# Patient Record
Sex: FEMALE | Race: WHITE | NOT HISPANIC OR LATINO | Employment: FULL TIME | URBAN - METROPOLITAN AREA
[De-identification: names, ages, dates, MRNs, and addresses within clinical notes are randomized per-mention and may not be internally consistent; named-entity substitution may affect disease eponyms.]

---

## 2017-05-02 ENCOUNTER — HOSPITAL ENCOUNTER (EMERGENCY)
Facility: HOSPITAL | Age: 27
Discharge: HOME | End: 2017-05-02
Attending: EMERGENCY MEDICINE
Payer: COMMERCIAL

## 2017-05-02 VITALS
SYSTOLIC BLOOD PRESSURE: 110 MMHG | HEIGHT: 66 IN | TEMPERATURE: 98 F | WEIGHT: 153 LBS | BODY MASS INDEX: 24.59 KG/M2 | HEART RATE: 66 BPM | DIASTOLIC BLOOD PRESSURE: 56 MMHG | OXYGEN SATURATION: 100 % | RESPIRATION RATE: 18 BRPM

## 2017-05-02 DIAGNOSIS — O20.0 THREATENED ABORTION: Primary | ICD-10-CM

## 2017-05-02 LAB
ABO + RH BLD: NORMAL
ALBUMIN SERPL BCP-MCNC: 4.3 G/DL
ALP SERPL-CCNC: 62 U/L
ALT SERPL W/O P-5'-P-CCNC: 12 U/L
ANION GAP SERPL CALC-SCNC: 9 MMOL/L
AST SERPL-CCNC: 17 U/L
B-HCG UR QL: POSITIVE
BASOPHILS # BLD AUTO: 0.01 K/UL
BASOPHILS NFR BLD: 0.1 %
BILIRUB SERPL-MCNC: 0.4 MG/DL
BILIRUB UR QL STRIP: NEGATIVE
BLD GP AB SCN CELLS X3 SERPL QL: NORMAL
BUN SERPL-MCNC: 9 MG/DL
CALCIUM SERPL-MCNC: 10.2 MG/DL
CHLORIDE SERPL-SCNC: 104 MMOL/L
CLARITY UR REFRACT.AUTO: CLEAR
CO2 SERPL-SCNC: 24 MMOL/L
COLOR UR AUTO: YELLOW
CREAT SERPL-MCNC: 0.9 MG/DL
CTP QC/QA: YES
DIFFERENTIAL METHOD: ABNORMAL
EOSINOPHIL # BLD AUTO: 0.1 K/UL
EOSINOPHIL NFR BLD: 0.5 %
ERYTHROCYTE [DISTWIDTH] IN BLOOD BY AUTOMATED COUNT: 12.6 %
EST. GFR  (AFRICAN AMERICAN): >60 ML/MIN/1.73 M^2
EST. GFR  (NON AFRICAN AMERICAN): >60 ML/MIN/1.73 M^2
GLUCOSE SERPL-MCNC: 93 MG/DL
GLUCOSE UR QL STRIP: NEGATIVE
HCG INTACT+B SERPL-ACNC: NORMAL MIU/ML
HCT VFR BLD AUTO: 40.4 %
HGB BLD-MCNC: 14.3 G/DL
HGB UR QL STRIP: NEGATIVE
KETONES UR QL STRIP: NEGATIVE
LEUKOCYTE ESTERASE UR QL STRIP: NEGATIVE
LYMPHOCYTES # BLD AUTO: 1.8 K/UL
LYMPHOCYTES NFR BLD: 19.1 %
MCH RBC QN AUTO: 32.4 PG
MCHC RBC AUTO-ENTMCNC: 35.4 %
MCV RBC AUTO: 92 FL
MONOCYTES # BLD AUTO: 0.3 K/UL
MONOCYTES NFR BLD: 3 %
NEUTROPHILS # BLD AUTO: 7.4 K/UL
NEUTROPHILS NFR BLD: 77 %
NITRITE UR QL STRIP: NEGATIVE
PH UR STRIP: 7 [PH] (ref 5–8)
PLATELET # BLD AUTO: 254 K/UL
PMV BLD AUTO: 9.5 FL
POTASSIUM SERPL-SCNC: 4.2 MMOL/L
PROT SERPL-MCNC: 7.4 G/DL
PROT UR QL STRIP: NEGATIVE
RBC # BLD AUTO: 4.41 M/UL
SODIUM SERPL-SCNC: 137 MMOL/L
SP GR UR STRIP: 1.01 (ref 1–1.03)
URN SPEC COLLECT METH UR: NORMAL
UROBILINOGEN UR STRIP-ACNC: NEGATIVE EU/DL
WBC # BLD AUTO: 9.62 K/UL

## 2017-05-02 PROCEDURE — 81025 URINE PREGNANCY TEST: CPT | Performed by: EMERGENCY MEDICINE

## 2017-05-02 PROCEDURE — 85025 COMPLETE CBC W/AUTO DIFF WBC: CPT

## 2017-05-02 PROCEDURE — 99284 EMERGENCY DEPT VISIT MOD MDM: CPT | Mod: ,,, | Performed by: EMERGENCY MEDICINE

## 2017-05-02 PROCEDURE — 84702 CHORIONIC GONADOTROPIN TEST: CPT

## 2017-05-02 PROCEDURE — 86900 BLOOD TYPING SEROLOGIC ABO: CPT

## 2017-05-02 PROCEDURE — 80053 COMPREHEN METABOLIC PANEL: CPT

## 2017-05-02 PROCEDURE — 99284 EMERGENCY DEPT VISIT MOD MDM: CPT | Mod: 25

## 2017-05-02 PROCEDURE — 81003 URINALYSIS AUTO W/O SCOPE: CPT

## 2017-05-02 PROCEDURE — 86850 RBC ANTIBODY SCREEN: CPT

## 2017-05-02 NOTE — ED AVS SNAPSHOT
[facility]  [address] [facility] [address] [facility] [address]               Anabel Serrato   2017 10:27 AM   ED    Description:  Female : 1990   Department:  [facility]           Your Care was Coordinated By:     Provider Role From To    Emeka Moore MD Attending Provider 17 1045 17 1101    Silas Mei MD Attending Provider 17 1101 --    Cecy Jacobs PA-C Physician Assistant 17 1252 --      Reason for Visit     Vaginal Bleeding           Diagnoses this Visit        Comments    Threatened     -  Primary       ED Disposition     None           To Do List           Follow-up Information     Follow up with Jamel [facility]. Schedule an appointment as soon as possible for a visit in 1 week.    Specialty:  Internal Medicine    Contact information:    [address] [facility] [address] [phone]    Additional information:    [facility].        Follow up with Amber Baker MD. Schedule an appointment as soon as possible for a visit in 2 days.    Specialties:  Obstetrics, Obstetrics and Gynecology    Contact information:    [address] [facility] [address]  [facility] [id number] [phone]        [facility]     [facility] Nurse Care Line - 24/7 Assistance  Unless otherwise directed by your provider, please contact [facility], our nurse care line that is available for 24/7 assistance.     Registered nurses in the [facility] provide: appointment scheduling, clinical advisement, health education, and other advisory services.  Call: [phone] (toll free)               Medications           Message regarding Medications     Verify the changes and/or additions to your medication regime listed below are the same as discussed with your clinician today.  If any of these changes or additions are incorrect, please notify your  "healthcare provider.             Verify that the below list of medications is an accurate representation of the medications you are currently taking.  If none reported, the list may be blank. If incorrect, please contact your healthcare provider. Carry this list with you in case of emergency.           Current Medications     PNV no15-iron fum & ps cmp-FA (CONCEPT OB) 85-1 mg Cap Take 1 capsule by mouth Daily.           Clinical Reference Information           Your Vitals Were     BP Pulse Temp Resp Height Weight    110/56 (BP Location: Left arm, Patient Position: Sitting, BP Method: Automatic) 66 98.4 °F (36.9 °C) (Oral) 18 5' 6" (1.676 m) 69.4 kg (153 lb)    SpO2 BMI             100% 24.69 kg/m2         Allergies as of 5/2/2017        Reactions    No Known Drug Allergies       Immunizations Administered on Date of Encounter - 5/2/2017     None      ED Micro, Lab, POCT     Start Ordered       Status Ordering Provider    05/02/17 1052 05/02/17 1051  Urinalysis  STAT      Final result     05/02/17 1051 05/02/17 1051  CBC auto differential  STAT      Final result     05/02/17 1051 05/02/17 1051  Comprehensive metabolic panel  STAT      Final result     05/02/17 1051 05/02/17 1051  hCG, quantitative, pregnancy  STAT      Final result     05/02/17 1038 05/02/17 1037  POCT urine pregnancy  Once      Final result       ED Imaging Orders     Start Ordered       Status Ordering Provider    05/02/17 1109 05/02/17 1108  US OB Less Than 14 Wks with Transvag(xpd  1 time imaging      Final result         Discharge Instructions         Understanding Miscarriage: Possible Causes  Miscarriage is common, but finding its cause may not be easy. If a cause can be found, its likely to be a problem with the baby or the structure of the uterus. Other factors cause miscarriage, but they are less common.  Problems with the baby  Either of the following problems with the baby can cause a miscarriage:  · There is a problem with the babys " chromosomes (genes that carry the information needed for life).  · Birth defects  Problems with the uterus or cervix  Any of the following problems with the uterus or cervix can cause a miscarriage:  · The uterus may be divided (have a septum), or have fibroids or adhesions.  · The lining of the uterus may be too thin for the fertilized egg to implant.  · The cervix may be too weak to support the weight of a pregnancy.  Other factors  Any of the following problems can cause a miscarriage:  · A serious illness, such as uncontrolled dabetes mellitus.   · A bad injury, perhaps during a car accident.  · Exposure to toxins or radiation.  What does not cause miscarriage  Plenty of myths and old wives tales try to explain the cause of miscarriage. But they are fiction--not fact. None of the following activities causes miscarriage:  · Carrying groceries  · Lifting a small child  · Wearing high heels  · Coloring your hair  · Having sex  · Vacuuming · Working outside the home  · Being a vegetarian  · Eating spicy foods  · Having a Pap smear  · Riding a horse or a bicycle  · Wishing away or denying a pregnancy       Date Last Reviewed: 6/1/2016  © 3792-6588 [employer]. [address] [place] [address]. All rights reserved. This information is not intended as a substitute for professional medical care. Always follow your healthcare professional's instructions.          [facility] Sign-Up     Activating your [facility] account is as easy as 1-2-3!     1) Visit [email or web address], select Sign Up Now, enter this activation code and your date of birth, then select Next.  [email or web address]  Expires: 6/16/2017  2:56 PM      2) Create a username and password to use when you visit [facility] in the future and select a security question in case you lose your password and select Next.    3) Enter your e-mail address and click Sign Up!    Additional Information  If you have questions, please e-mail  [email or web address] or call [phone] to talk to our [facility] staff. Remember, [facility] is NOT to be used for urgent needs. For medical emergencies, dial 911.          [facility] complies with applicable Federal civil rights laws and does not discriminate on the basis of race, color, national origin, age, disability, or sex.        Language Assistance Services     ATTENTION: Language assistance services are available, free of charge. Please call [phone].      ATENCIÓN: Si habla español, tiene a diana disposición servicios gratuitos de asistencia lingüística. Llame al [phone].     CHÚ Ý: N?u b?n nói Ti?ng Vi?t, có các d?ch v? h? tr? ngôn ng? mi?n phí dành cho b?n. G?i s? [phone].

## 2017-05-02 NOTE — ED TRIAGE NOTES
Pt states she is pregnant and she was spotting this morning. Pt is unsure when she became pregnant.  Her LMP was approximately 1.5 months ago, but she has an irregular cycle. Pt states the spotting was dark red. Pt also reports cramping.

## 2017-05-02 NOTE — ED PROVIDER NOTES
Encounter Date: 2017       History     Chief Complaint   Patient presents with    Vaginal Bleeding     and cramping, lmp 6 weeks ago, + upt at home     Review of patient's allergies indicates:   Allergen Reactions    No known drug allergies      HPI Comments: 26 y.o.  with no significant past medical history presents to the emergency room with a positive pregnancy test from home.  Patient states that LMP was 1-2 months ago.  Patient states she started having spotting and lower abdominal pain today. At time of exam, bleeding has stopped, but cramping has continued.  Patient denies any nausea, vomiting, fevers, chills, blood clots, chest pain, shortness of breath, or any other complaints.          The history is provided by the patient.     History reviewed. No pertinent past medical history.  Past Surgical History:   Procedure Laterality Date    episitomy  2009    during delivery of her son     Family History   Problem Relation Age of Onset    Breast cancer Maternal Grandmother      Social History   Substance Use Topics    Smoking status: Current Every Day Smoker     Years: 7.00     Types: Cigarettes    Smokeless tobacco: Never Used      Comment: Pt smokes 2 cigarettes today    Alcohol use No     Review of Systems   Constitutional: Negative for activity change, chills, fatigue and fever.   HENT: Negative for congestion, facial swelling, sinus pressure and trouble swallowing.    Eyes: Negative for photophobia, pain and discharge.   Respiratory: Negative for apnea, choking and shortness of breath.    Cardiovascular: Negative for chest pain and palpitations.   Gastrointestinal: Negative for abdominal distention, abdominal pain, constipation, diarrhea, nausea and vomiting.   Endocrine: Negative for cold intolerance, polydipsia and polyuria.   Genitourinary: Negative for dysuria, flank pain, hematuria, urgency and vaginal discharge.   Musculoskeletal: Negative for arthralgias, gait problem and  myalgias.   Skin: Negative for color change and rash.   Allergic/Immunologic: Negative for environmental allergies.   Neurological: Negative for dizziness, tremors, speech difficulty, numbness and headaches.   Hematological: Negative for adenopathy.   Psychiatric/Behavioral: Negative for agitation, confusion and dysphoric mood.       Physical Exam   Initial Vitals   BP Pulse Resp Temp SpO2   05/02/17 1023 05/02/17 1023 05/02/17 1023 05/02/17 1023 05/02/17 1023   132/77 81 18 98.3 °F (36.8 °C) 98 %     Physical Exam    Nursing note and vitals reviewed.  Constitutional: She appears well-developed and well-nourished.   HENT:   Head: Normocephalic and atraumatic.   Eyes: EOM are normal. Pupils are equal, round, and reactive to light.   Neck: Normal range of motion. Neck supple. No thyromegaly present. No tracheal deviation present.   Cardiovascular: Normal rate and regular rhythm. Exam reveals no gallop and no friction rub.    No murmur heard.  Pulmonary/Chest: Breath sounds normal. No stridor. No respiratory distress. She has no wheezes. She has no rhonchi. She has no rales. She exhibits no tenderness.   Abdominal: Soft. Bowel sounds are normal. She exhibits no distension and no mass. There is no tenderness. There is no rebound and no guarding.   Musculoskeletal: Normal range of motion. She exhibits no edema or tenderness.   Neurological: She is alert and oriented to person, place, and time.   Skin: Skin is warm and dry.   Psychiatric: She has a normal mood and affect.       Imaging Results         US OB Less Than 14 Wks with Transvag(xpd (Final result) Result time:  05/02/17 13:53:03    Final result by Herberth Ovalle MD (05/02/17 13:53:03)    Impression:      1. Single, viable intrauterine pregnancy.    2. Heart rate at the lower limits of normal might be due to early age. Continued followup is recommended.    3. Small, subchorionic hemorrhage.  ______________________________________     Electronically signed by  resident: COLTON FERREIRA MD  Date:     05/02/17  Time:    13:44            As the supervising and teaching physician, I personally reviewed the images and resident's interpretation and I agree with the findings.          Electronically signed by: QUENTIN PABLO MD  Date:     05/02/17  Time:    13:53     Narrative:    ULTRASOUND PELVIS OBSTETRIC LESS THAN 14 WEEKS WITH TRANSVAGINAL    INDICATION: Bleeding.     COMPARISON: None.    TECHNIQUE: Ultrasound images of the female pelvis were acquired utilizing transabdominal technique with bladder sonographic window and transvaginal technique post-void.     FINDINGS:     UTERUS: Uterine parenchyma is homogeneous without evidence for masses. A gestational sac containing a yolk sac and single embryo is identified. The crown-rump length is 4.38 cm corresponding to a sonographic gestational age of 6 weeks and 0 days. The maximum obtained fetal heart rate is 114 bpm. This rate is at the lower limits of normal but this may be due to early age and continued followup is recommended. A small heterogeneous fluid collection in the left endometrial cavity likely represents a small, nonsignificant subchorionic hemorrhage.    RIGHT OVARY: The right ovary is normal in size and measures 2.4 x 2.3 x 2.6 cm.   Arterial and venous flow are preserved.     LEFT OVARY: The left ovary is normal in size and measures 4.6 x 2.1 x 3.0 cm. A corpus luteum is identified measuring 1.5 x 1.7 x 2.3 cm. Arterial and venous flow are preserved.                ED Course   Procedures  Labs Reviewed   CBC W/ AUTO DIFFERENTIAL - Abnormal; Notable for the following:        Result Value    MCH 32.4 (*)     Gran% 77.0 (*)     Mono% 3.0 (*)     All other components within normal limits   POCT URINE PREGNANCY - Abnormal; Notable for the following:     POC Preg Test, Ur Positive (*)     All other components within normal limits   COMPREHENSIVE METABOLIC PANEL   HCG, QUANTITATIVE, PREGNANCY   URINALYSIS   TYPE & SCREEN  - OB PROFILE             Medical Decision Making:   History:   Old Medical Records: I decided to obtain old medical records.       APC / Resident Notes:   26 y.o.  with no significant past medical history presents to the emergency room with a positive pregnancy test from home and some vaginal spotting.  Physical exam reveals female in no acute distress.  Heart regular rate and rhythm.  Lungs clear to auscultation bilaterally.  Abdomen soft nontender nondistended.  Differential diagnosis includes but is not excluded to threatened  and ectopic pregnancy.  Will obtain CBC, CMP, type and screen, hCG, UA, pregnancy test, and ultrasound.    CBC shows hemoglobin of 14.3 hematocrit 40.4.  CMP unremarkable.  Type and screen shows Rh+.  Beta HCG at 22222.  Urine analysis unremarkable.  Urinary pregnancy positive.  Ultrasound showed a single viable intrauterine pregnancy and a small subchorionic hemorrhage.  Results of ultrasound were discussed with patient.  Patient was to undergo a pelvic exam prior to discharge, however eloped prior to receiving this and discharge paperwork.  Patient was instructed to follow up with OB/GYN in near future and also informed of threatened  with bleed.                ED Course     Clinical Impression:   The encounter diagnosis was Threatened .    Disposition:   Disposition: Eloped  Condition: Stable       Cecy Jacobs PA-C  [phone]

## 2017-05-02 NOTE — PROVIDER PROGRESS NOTES - EMERGENCY DEPT.
Encounter Date: 2017    ED Physician Progress Notes             INTAKE PHYSICIAN EVALUATION  26 y.o.  presents with UPT + at home (LMP 1-2 months ago) started having spotting and lower abdominal pain today. Bleeding has stopped but cramping has continued.       BRIEF PHYSICAL EXAM FINDINGS:  Unremarkable, non toxic    INITIAL INTAKE PLAN:  Early pregnancy, abdominal pain. Eval for ectopic.     I initially evaluated this patient and ordered workup while in intake.  The patient will receive a full evaluation in an ED pod when space is available.  All results from tests ordered in intake will not be followed by the intake team, including myself. All results will be followed by the ED Pod team.    KOBI Moore MD  Emergency Department Staff Physician    10:48 AM 2017

## 2017-05-02 NOTE — DISCHARGE INSTRUCTIONS
Understanding Miscarriage: Possible Causes  Miscarriage is common, but finding its cause may not be easy. If a cause can be found, its likely to be a problem with the baby or the structure of the uterus. Other factors cause miscarriage, but they are less common.  Problems with the baby  Either of the following problems with the baby can cause a miscarriage:  · There is a problem with the babys chromosomes (genes that carry the information needed for life).  · Birth defects  Problems with the uterus or cervix  Any of the following problems with the uterus or cervix can cause a miscarriage:  · The uterus may be divided (have a septum), or have fibroids or adhesions.  · The lining of the uterus may be too thin for the fertilized egg to implant.  · The cervix may be too weak to support the weight of a pregnancy.  Other factors  Any of the following problems can cause a miscarriage:  · A serious illness, such as uncontrolled dabetes mellitus.   · A bad injury, perhaps during a car accident.  · Exposure to toxins or radiation.  What does not cause miscarriage  Plenty of myths and old wives tales try to explain the cause of miscarriage. But they are fiction--not fact. None of the following activities causes miscarriage:  · Carrying groceries  · Lifting a small child  · Wearing high heels  · Coloring your hair  · Having sex  · Vacuuming · Working outside the home  · Being a vegetarian  · Eating spicy foods  · Having a Pap smear  · Riding a horse or a bicycle  · Wishing away or denying a pregnancy       Date Last Reviewed: 6/1/2016  © 5324-5086 [employer]. [address] [place] [address], [place] [address]. All rights reserved. This information is not intended as a substitute for professional medical care. Always follow your healthcare professional's instructions.

## 2017-05-08 ENCOUNTER — TELEPHONE (OUTPATIENT)
Dept: OBSTETRICS AND GYNECOLOGY | Facility: CLINIC | Age: 27
End: 2017-05-08

## 2017-05-08 NOTE — TELEPHONE ENCOUNTER
"----- Message from Hannah Choudhary sent at 5/8/2017 11:32 AM CDT -----  Contact: GOMEZ CHATTERJEE [[id number]]  x_  1st Request  _  2nd Request  _  3rd Request        Who: GOMEZ CHATTERJEE [[id number]]    Why: Patient was seen in ER on 5/2. She states she is "about 6 weeks pregnant" and would like to schedule her initial ob appt. She is unsure when her LMP was but states it was "towards the end of March". Thanks     What Number to Call Back:     When to Expect a call back: (Before the end of the day)   -- if call after 3:00 call back will be tomorrow.    "

## 2017-05-09 ENCOUNTER — TELEPHONE (OUTPATIENT)
Dept: OBSTETRICS AND GYNECOLOGY | Facility: CLINIC | Age: 27
End: 2017-05-09

## 2017-05-09 NOTE — TELEPHONE ENCOUNTER
----- Message from Josiah Peñaloza sent at 5/9/2017  9:09 AM CDT -----  Contact: Pt  X_ 1st Request  _ 2nd Request  _ 3rd Request    Who:GOMEZ CHATTERJEE [[id number]]    Why: Patient is returning a call    What Number to Call Back: [phone]    When to Expect a call back: (Before the end of the day)  -- if call after 3:00 call back will be tomorrow.

## 2017-05-24 ENCOUNTER — INITIAL PRENATAL (OUTPATIENT)
Dept: OBSTETRICS AND GYNECOLOGY | Facility: CLINIC | Age: 27
End: 2017-05-24
Attending: OBSTETRICS & GYNECOLOGY
Payer: COMMERCIAL

## 2017-05-24 ENCOUNTER — LAB VISIT (OUTPATIENT)
Dept: LAB | Facility: OTHER | Age: 27
End: 2017-05-24
Attending: OBSTETRICS & GYNECOLOGY
Payer: COMMERCIAL

## 2017-05-24 VITALS
DIASTOLIC BLOOD PRESSURE: 70 MMHG | BODY MASS INDEX: 24.66 KG/M2 | WEIGHT: 152.75 LBS | SYSTOLIC BLOOD PRESSURE: 120 MMHG

## 2017-05-24 DIAGNOSIS — Z34.81 ENCOUNTER FOR SUPERVISION OF OTHER NORMAL PREGNANCY IN FIRST TRIMESTER: ICD-10-CM

## 2017-05-24 PROBLEM — Z34.90 SUPERVISION OF NORMAL PREGNANCY: Status: ACTIVE | Noted: 2017-05-24

## 2017-05-24 LAB
C TRACH DNA SPEC QL NAA+PROBE: NOT DETECTED
HBV SURFACE AG SERPL QL IA: NEGATIVE
N GONORRHOEA DNA SPEC QL NAA+PROBE: NOT DETECTED
RPR SER QL: NORMAL

## 2017-05-24 PROCEDURE — 87340 HEPATITIS B SURFACE AG IA: CPT

## 2017-05-24 PROCEDURE — 86592 SYPHILIS TEST NON-TREP QUAL: CPT

## 2017-05-24 PROCEDURE — 87591 N.GONORRHOEAE DNA AMP PROB: CPT

## 2017-05-24 PROCEDURE — 36415 COLL VENOUS BLD VENIPUNCTURE: CPT

## 2017-05-24 PROCEDURE — 0502F SUBSEQUENT PRENATAL CARE: CPT | Mod: S$GLB,,, | Performed by: OBSTETRICS & GYNECOLOGY

## 2017-05-24 PROCEDURE — 87086 URINE CULTURE/COLONY COUNT: CPT

## 2017-05-24 PROCEDURE — 99999 PR PBB SHADOW E&M-EST. PATIENT-LVL III: CPT | Mod: PBBFAC,,, | Performed by: OBSTETRICS & GYNECOLOGY

## 2017-05-24 PROCEDURE — 86703 HIV-1/HIV-2 1 RESULT ANTBDY: CPT

## 2017-05-24 PROCEDURE — 86762 RUBELLA ANTIBODY: CPT

## 2017-05-24 NOTE — PROGRESS NOTES
Denies complaints.  Diagnoses and all orders for this visit:    Encounter for supervision of other normal pregnancy in first trimester  -     Rubella antibody, IgG; Future  -     RPR; Future  -     HIV-1 and HIV-2 antibodies; Future  -     Urine culture  -     Hepatitis B surface antigen; Future  -     US OB/GYN Procedure ([facility]); Future  -     C. trachomatis/N. gonorrhoeae by AMP DNA Cervix

## 2017-05-25 LAB
BACTERIA UR CULT: NORMAL
HIV 1+2 AB+HIV1 P24 AG SERPL QL IA: NEGATIVE
RUBV IGG SER-ACNC: 24.6 IU/ML
RUBV IGG SER-IMP: REACTIVE

## 2017-06-02 ENCOUNTER — PROCEDURE VISIT (OUTPATIENT)
Dept: OBSTETRICS AND GYNECOLOGY | Facility: CLINIC | Age: 27
End: 2017-06-02
Attending: OBSTETRICS & GYNECOLOGY
Payer: COMMERCIAL

## 2017-06-02 DIAGNOSIS — Z34.81 ENCOUNTER FOR SUPERVISION OF OTHER NORMAL PREGNANCY IN FIRST TRIMESTER: ICD-10-CM

## 2017-06-02 PROCEDURE — 76801 OB US < 14 WKS SINGLE FETUS: CPT | Mod: S$GLB,,, | Performed by: OBSTETRICS & GYNECOLOGY

## 2017-06-12 ENCOUNTER — TELEPHONE (OUTPATIENT)
Dept: OBSTETRICS AND GYNECOLOGY | Facility: CLINIC | Age: 27
End: 2017-06-12

## 2017-06-22 ENCOUNTER — TELEPHONE (OUTPATIENT)
Dept: OBSTETRICS AND GYNECOLOGY | Facility: CLINIC | Age: 27
End: 2017-06-22

## 2017-06-22 ENCOUNTER — ROUTINE PRENATAL (OUTPATIENT)
Dept: OBSTETRICS AND GYNECOLOGY | Facility: CLINIC | Age: 27
End: 2017-06-22
Payer: COMMERCIAL

## 2017-06-22 VITALS
DIASTOLIC BLOOD PRESSURE: 68 MMHG | SYSTOLIC BLOOD PRESSURE: 110 MMHG | BODY MASS INDEX: 25.87 KG/M2 | WEIGHT: 160.25 LBS

## 2017-06-22 DIAGNOSIS — Z34.82 ENCOUNTER FOR SUPERVISION OF OTHER NORMAL PREGNANCY IN SECOND TRIMESTER: Primary | ICD-10-CM

## 2017-06-22 PROCEDURE — 99999 PR PBB SHADOW E&M-EST. PATIENT-LVL III: CPT | Mod: PBBFAC,,, | Performed by: OBSTETRICS & GYNECOLOGY

## 2017-06-22 PROCEDURE — 0502F SUBSEQUENT PRENATAL CARE: CPT | Mod: S$GLB,,, | Performed by: OBSTETRICS & GYNECOLOGY

## 2017-06-22 NOTE — PROGRESS NOTES
Patient doing well today. No vaginal bleeding. Nausea/vomiting improved. Not interested in genetic testing.     /68   Wt 72.7 kg (160 lb 4.4 oz)   BMI 25.87 kg/m²     27 y.o., at 13w2d by Estimated Date of Delivery: 17  Patient Active Problem List   Diagnosis    Supervision of normal pregnancy     OB History    Para Term  AB Living   3 2 2     2   SAB TAB Ectopic Multiple Live Births           2      # Outcome Date GA Lbr Ric/2nd Weight Sex Delivery Anes PTL Lv   3 Current            2 Term 12 41w0d    Vag-Spont   LATOSHA   1 Term 09   3.544 kg (7 lb 13 oz) M Vag-Spont  N LATOSHA          Dating reviewed    Allergies and problem list reviewed and updated    Medical and surgical history reviewed    Prenatal labs reviewed and updated    Physical Exam:  ABD: soft, gravid, nontende    Assessment:  prenatal    Plan:   Declined genetic screening  [facility] U/S order placed  Follow up 4 Weeks

## 2017-06-28 ENCOUNTER — TELEPHONE (OUTPATIENT)
Dept: OBSTETRICS AND GYNECOLOGY | Facility: CLINIC | Age: 27
End: 2017-06-28

## 2017-06-28 NOTE — TELEPHONE ENCOUNTER
Called pt and instructed her to call MFM at [phone] to schedule u/s. Also informed her that appt should be  booked between JUL 28-AUG 4. MACARENA: 12/22/2017. Pt voiced understanding.

## 2017-06-28 NOTE — TELEPHONE ENCOUNTER
----- Message from Faby Vargas RN sent at 6/28/2017  3:46 PM CDT -----  Contact: Patient  Sounds good, tell pt to call our office [phone] and her u/s should be booked between JUL 28-AUG 4    Thanks!  Faby  ----- Message -----  From: Nayana Rivera LPN  Sent: 6/28/2017   3:41 PM  To: Faby Vargas RN    I spoke with Dr Hoovre and she says to use 12/22/2017 as her MACARENA. Thanks Faby    ----- Message -----  From: Faby Vargas RN  Sent: 6/28/2017   3:34 PM  To: Nayana Rivera LPN    Depends on which MACARENA they are using. The MACARENA placed with the order is 12/26/17 anatomy would be between 8/1 - 8/8 if using MACARENA 12/22/17 then July 28-Aug 4 would be the time frame. Confirm which MACARENA and we can schedule, it should not be the end of August    faby    ----- Message -----  From: Nayana Rivera LPN  Sent: 6/28/2017   3:28 PM  To: CHANELLE Blood. I spoke with this pt and she stated that she was told that she could not get an US before 08/25/2017. Is that true?    ----- Message -----  From: Olena Park  Sent: 6/28/2017   1:49 PM  To: Kiko DAVID Staff    x_ 1st Request  _ 2nd Request  _ 3rd Request    Who: GOMEZ CHATTERJEE [[id number]]    Why: Patient is calling in regards to requesting a ultrasound order be placed before 08/25/17  to reveal gender. Patient is needing a call back    What Number to Call Back: [phone]    When to Expect a call back: (Before the end of the day)  -- if call after 3:00 call back will be tomorrow.

## 2017-07-05 ENCOUNTER — TELEPHONE (OUTPATIENT)
Dept: OBSTETRICS AND GYNECOLOGY | Facility: CLINIC | Age: 27
End: 2017-07-05

## 2017-07-06 ENCOUNTER — TELEPHONE (OUTPATIENT)
Dept: OBSTETRICS AND GYNECOLOGY | Facility: CLINIC | Age: 27
End: 2017-07-06

## 2017-07-14 ENCOUNTER — TELEPHONE (OUTPATIENT)
Dept: OBSTETRICS AND GYNECOLOGY | Facility: CLINIC | Age: 27
End: 2017-07-14

## 2017-07-14 NOTE — TELEPHONE ENCOUNTER
Called pt to discuss centering-would like to participate but employer making it difficult to make appts-pt scheduled for 8/1

## 2017-07-28 ENCOUNTER — APPOINTMENT (OUTPATIENT)
Dept: LAB | Facility: OTHER | Age: 27
End: 2017-07-28
Attending: OBSTETRICS & GYNECOLOGY
Payer: MEDICAID

## 2017-07-28 ENCOUNTER — OFFICE VISIT (OUTPATIENT)
Dept: MATERNAL FETAL MEDICINE | Facility: CLINIC | Age: 27
End: 2017-07-28
Payer: MEDICAID

## 2017-07-28 DIAGNOSIS — Q66.89 CLUB FOOT, UNSPECIFIED LATERALITY: ICD-10-CM

## 2017-07-28 DIAGNOSIS — O35.9XX1 FETAL ABNORMALITY AFFECTING MANAGEMENT OF MOTHER, FETUS 1: Primary | ICD-10-CM

## 2017-07-28 DIAGNOSIS — Z36.89 ENCOUNTER FOR FETAL ANATOMIC SURVEY: ICD-10-CM

## 2017-07-28 DIAGNOSIS — Z34.82 ENCOUNTER FOR SUPERVISION OF OTHER NORMAL PREGNANCY IN SECOND TRIMESTER: ICD-10-CM

## 2017-07-28 PROCEDURE — 99202 OFFICE O/P NEW SF 15 MIN: CPT | Mod: S$PBB,TH,25, | Performed by: OBSTETRICS & GYNECOLOGY

## 2017-07-28 PROCEDURE — 76811 OB US DETAILED SNGL FETUS: CPT | Mod: 26,S$PBB,, | Performed by: OBSTETRICS & GYNECOLOGY

## 2017-07-28 NOTE — LETTER
July 28, 2017      Jerri Hoover MD  [address]  [address]  [facility] [address]           [facility]  [address]  [facility] [address]  Phone: [phone]          Patient: Anabel Serrato   MR Number: [id number]   YOB: 1990   Date of Visit: 7/28/2017       Dear Dr. Jerri Hoover:    Thank you for referring Anabel Serrato to me for evaluation. Attached you will find relevant portions of my assessment and plan of care.    If you have questions, please do not hesitate to call me. I look forward to following Anabel Serrato along with you.    Sincerely,    Ava Patel MD    Enclosure  CC:  No Recipients    If you would like to receive this communication electronically, please contact [email or web address] or [phone] to request more information on [employer] Link access.    For providers and/or their staff who would like to refer a patient to [facility], please contact us through our one-stop-shop provider referral line, [facility], at [phone].    If you feel you have received this communication in error or would no longer like to receive these types of communications, please e-mail [email or web address]

## 2017-07-28 NOTE — PROGRESS NOTES
Indication  ========    Fetal anatomy survey.    Method  ======    Transabdominal ultrasound examination, Voluson E10. View: Good view.    Pregnancy  =========    Rios pregnancy. Number of fetuses: 1.    Dating  ======    Cycle: regular cycle  Ultrasound examination on: 7/28/2017  GA by U/S based upon: AC, BPD, Femur, HC  GA by U/S 18 w + 6 d  MACARENA by U/S: 12/23/2017  Assigned: Dating performed on 06/2/2017, based on ultrasound (CRL)  Assigned GA 19 w + 0 d  Assigned MACARENA: 12/22/2017    General Evaluation  ==============    Cardiac activity: present.  bpm.  Fetal movements: visualized.  Presentation: Unstable lie.  Placenta: posterior.  Umbilical cord: 3 vessel cord, PCI suboptimal.  Amniotic fluid: normal amount.    Fetal Biometry  ============    Fetal Biometry  BPD 41.7 mm 18w 4d Hadlock  OFD 56.1 mm 19w 5d Jocelin  .5 mm 18w 4d Hadlock  .4 mm 19w 0d Hadlock  Femur 29.4 mm 19w 0d Hadlock  Cerebellum tr 19.7 mm 19w 5d Rosen  CM 5.1 mm  Nuchal fold 2.96 mm   g  Calculated by: Hadlock (BPD-HC-AC-FL)  EFW (lb) 0 lb  EFW (oz) 9 oz  Cephalic index 0.74  HC / AC 1.16  FL / BPD 0.71  FL / AC 0.22   bpm  Head / Face / Neck   6.5 mm  Nasal bone 5.7 mm    Fetal Anatomy  ===========    Cranium: normal  Lateral ventricles: normal  Choroid plexus: normal  Midline falx: normal  Cavum septi pellucidi: normal  Cerebellum: normal  Cisterna magna: normal  Head shape: normal  Rt lateral ventricle: normal  Lt lateral ventricle: normal  Rt choroid plexus: normal  Lt choroid plexus: normal  Parenchyma: normal  Third ventricle: normal  Posterior fossa: normal  Cerebellar lobes: normal  Vermis: normal  Neck: normal  Nuchal fold: normal  Lips: normal  Profile: normal  Nose: normal  Maxilla: normal  Mandible: normal  RVOT: normal  LVOT: normal  4-chamber view: 4-chamber suboptimal, septum suboptimal  Situs: normal  Aortic arch: normal  Ductal arch: normal  SVC: normal  IVC: normal  3-vessel  view: suboptimal  3-vessel-trachea view: normal  Cardiac position: normal  Cardiac axis: normal  Cardiac size: normal  Cardiac rhythm: normal  Rt lung: normal  Lt lung: normal  Diaphragm: suboptimal  Cord insertion: normal  Stomach: normal  Kidneys: normal  Bladder: normal  Genitals: normal  Abdom. wall: normal  Abdom. cavity: normal  Rt kidney: normal  Lt kidney: normal  Liver: normal  Bowel: normal  Cervical spine: suboptimal  Thoracic spine: suboptimal  Lumbar spine: suboptimal  Sacral spine: suboptimal  Arms: suboptimal  Legs: normal  Rt arm: suboptimal  Lt arm: suboptimal  Rt hand: normal  Lt hand: normal  Rt leg: normal  Lt leg: normal  Rt foot: normal  Lt foot: abnormal  Lt foot: clubbing  Position of hands: normal  Position of feet: normal  Wants to know gender: no    Maternal Structures  ===============    Uterus / Cervix  Uterus: Normal  Cervical length 37.0 mm  Ovaries / Tubes / Adnexa  Rt ovary: Visualized  Lt ovary: Visualized  Other: Uterus and adnexa normal    Consultation  ==========    Type: Unilateral fetal clubfoot.  There is no family history of birth defects or clubfeet.    We discussed that clubfoot can be seen as an isolated condition or in association with conditions including but not limited to  chromosomal abnormalities (most commonly Trisomy 18), genetic disorders, neurological/musculoskeletal conditions, spina bifida, and  oligohydramnios. We discussed that there is the potential for a false diagnosis more commonly in the third trimester due to transient foot  position. We reviewed the potential association with other anomalies. It is more common in males than females. Those with associated  anomalies have a worse prognosis. If isolated clubfoot, conservative management with manipulation and serial casting is used and at times  surgical therapy is warranted. If this finding is confirmed on follow up ultrasounds, we would recommend considering consultation with  orthopedics. Clubfoot is  often familial. We discussed the risks, benefits, and alternatives of the quad screen. We did discuss the risks,  benefits, alternatives, and limitations of NIPT. We discussed the sensitivity and specificity and reviewed the potential for no call results. The  risks, benefits, and alternatives and  limitations of amniocentesis were also reviewed. We discussed the risk of 1 in 500 of a pregnancy related complication including pregnancy  loss. We also discussed the options of karyotype and microarray and assessment for ONTD. The patient elected to proceed with NIPT and  MSAFP only to assess for ONTD.    A total of 20 minutes was spent in face to face time with greater than half of that time spent in counseling and coordination of care.    Impression  =========    Rios live intrauterine pregnancy.  Biometry agrees with the clinical dating.  Placental cord insertion is suboptimally seen but is near fundus. Recommend reassessing at follow up to see if marginal.  Amniotic fluid volume is normal by qualitative assessment.  There is a left clubbed foot.  Some of the anatomy was suboptimally visualized. The remainder of the anatomy that was seen appeared normal.  Placenta is posterior without previa.  Transabdominal cervical length is normal.      Recommendation  ==============    Recommend follow up ultrasound in 4-5 weeks for fetal growth, assess suboptimally visualized anatomy/PCI, and recheck feet.  Fetal echo due to clubfoot.  Patient elected for NIPT and MSAFP only.

## 2017-08-01 ENCOUNTER — ROUTINE PRENATAL (OUTPATIENT)
Dept: OBSTETRICS AND GYNECOLOGY | Facility: CLINIC | Age: 27
End: 2017-08-01
Payer: MEDICAID

## 2017-08-01 VITALS
WEIGHT: 171.75 LBS | DIASTOLIC BLOOD PRESSURE: 68 MMHG | SYSTOLIC BLOOD PRESSURE: 112 MMHG | BODY MASS INDEX: 27.72 KG/M2

## 2017-08-01 DIAGNOSIS — Z34.82 ENCOUNTER FOR SUPERVISION OF OTHER NORMAL PREGNANCY IN SECOND TRIMESTER: Primary | ICD-10-CM

## 2017-08-01 PROCEDURE — 99213 OFFICE O/P EST LOW 20 MIN: CPT | Mod: TH,S$PBB,, | Performed by: STUDENT IN AN ORGANIZED HEALTH CARE EDUCATION/TRAINING PROGRAM

## 2017-08-01 PROCEDURE — 99999 PR PBB SHADOW E&M-EST. PATIENT-LVL II: CPT | Mod: PBBFAC,,, | Performed by: STUDENT IN AN ORGANIZED HEALTH CARE EDUCATION/TRAINING PROGRAM

## 2017-08-01 PROCEDURE — 99212 OFFICE O/P EST SF 10 MIN: CPT | Mod: PBBFAC,PO | Performed by: STUDENT IN AN ORGANIZED HEALTH CARE EDUCATION/TRAINING PROGRAM

## 2017-08-01 NOTE — PROGRESS NOTES
Complaints today: uri today.  She is having trouble with her job letting her go to doc appts.  Good fm.  Denies ctx, vb, lof     /68   Wt 77.9 kg (171 lb 11.8 oz)   BMI 27.72 kg/m²     27 y.o., at 19w0d by Estimated Date of Delivery: 17  Patient Active Problem List   Diagnosis    Supervision of normal pregnancy     OB History    Para Term  AB Living   3 2 2     2   SAB TAB Ectopic Multiple Live Births           2      # Outcome Date GA Lbr Ric/2nd Weight Sex Delivery Anes PTL Lv   3 Current            2 Term 12 41w0d    Vag-Spont   LATOSHA   1 Term 09   3.544 kg (7 lb 13 oz) M Vag-Spont  N LATOSHA          Dating reviewed    Allergies and problem list reviewed and updated    Medical and surgical history reviewed    Prenatal labs reviewed and updated    Physical Exam:  ABD: soft, gravid, nontender,     Assessment:  Anabel RODRIGUEZ was seen today for routine prenatal visit.    Diagnoses and all orders for this visit:    Encounter for supervision of other normal pregnancy in second trimester         Plan:   follow up labs   follow up 4 Weeks, kick counts, labor precautions

## 2017-08-02 ENCOUNTER — TELEPHONE (OUTPATIENT)
Dept: MATERNAL FETAL MEDICINE | Facility: CLINIC | Age: 27
End: 2017-08-02

## 2017-08-02 LAB — EXT MATERNIT21: NEGATIVE

## 2017-08-02 NOTE — TELEPHONE ENCOUNTER
Pt has been notified of NEGATIVE [id number] result. This specimen showed an expected representation of chromosomes 13, 18 and 21 material consistent with MALE fetus. Pt verbalized understanding of information.

## 2017-08-25 ENCOUNTER — CLINICAL SUPPORT (OUTPATIENT)
Dept: PEDIATRIC CARDIOLOGY | Facility: CLINIC | Age: 27
End: 2017-08-25
Attending: PEDIATRICS
Payer: MEDICAID

## 2017-08-25 ENCOUNTER — OFFICE VISIT (OUTPATIENT)
Dept: MATERNAL FETAL MEDICINE | Facility: CLINIC | Age: 27
End: 2017-08-25
Attending: OBSTETRICS & GYNECOLOGY
Payer: MEDICAID

## 2017-08-25 VITALS
SYSTOLIC BLOOD PRESSURE: 102 MMHG | HEART RATE: 84 BPM | HEIGHT: 66 IN | BODY MASS INDEX: 29.69 KG/M2 | WEIGHT: 184.75 LBS | DIASTOLIC BLOOD PRESSURE: 58 MMHG

## 2017-08-25 VITALS
WEIGHT: 184.75 LBS | BODY MASS INDEX: 29.82 KG/M2 | SYSTOLIC BLOOD PRESSURE: 102 MMHG | DIASTOLIC BLOOD PRESSURE: 58 MMHG

## 2017-08-25 DIAGNOSIS — Z36.89 ENCOUNTER FOR ULTRASOUND TO CHECK FETAL GROWTH: Primary | ICD-10-CM

## 2017-08-25 DIAGNOSIS — O35.9XX1 FETAL ABNORMALITY AFFECTING MANAGEMENT OF MOTHER, FETUS 1: ICD-10-CM

## 2017-08-25 DIAGNOSIS — O35.HXX0 CLUB FOOT OF FETUS AFFECTING MANAGEMENT OF MOTHER IN SINGLETON PREGNANCY, ANTEPARTUM: ICD-10-CM

## 2017-08-25 DIAGNOSIS — O35.HXX0 CLUB FOOT OF FETUS AFFECTING MANAGEMENT OF MOTHER IN SINGLETON PREGNANCY, ANTEPARTUM: Primary | ICD-10-CM

## 2017-08-25 PROCEDURE — 76827 ECHO EXAM OF FETAL HEART: CPT | Mod: PBBFAC | Performed by: PEDIATRICS

## 2017-08-25 PROCEDURE — 76827 ECHO EXAM OF FETAL HEART: CPT | Mod: 26,S$PBB,, | Performed by: PEDIATRICS

## 2017-08-25 PROCEDURE — 3008F BODY MASS INDEX DOCD: CPT | Mod: ,,, | Performed by: PEDIATRICS

## 2017-08-25 PROCEDURE — 99999 PR PBB SHADOW E&M-EST. PATIENT-LVL III: CPT | Mod: PBBFAC,,, | Performed by: PEDIATRICS

## 2017-08-25 PROCEDURE — 76816 OB US FOLLOW-UP PER FETUS: CPT | Mod: 26,S$PBB,, | Performed by: OBSTETRICS & GYNECOLOGY

## 2017-08-25 PROCEDURE — 99203 OFFICE O/P NEW LOW 30 MIN: CPT | Mod: 25,S$PBB,, | Performed by: PEDIATRICS

## 2017-08-25 PROCEDURE — 3008F BODY MASS INDEX DOCD: CPT | Mod: ,,, | Performed by: OBSTETRICS & GYNECOLOGY

## 2017-08-25 PROCEDURE — 76825 ECHO EXAM OF FETAL HEART: CPT | Mod: 26,S$PBB,, | Performed by: PEDIATRICS

## 2017-08-25 PROCEDURE — 93325 DOPPLER ECHO COLOR FLOW MAPG: CPT | Mod: 26,S$PBB,, | Performed by: PEDIATRICS

## 2017-08-25 PROCEDURE — 76825 ECHO EXAM OF FETAL HEART: CPT | Mod: PBBFAC | Performed by: PEDIATRICS

## 2017-08-25 PROCEDURE — 93325 DOPPLER ECHO COLOR FLOW MAPG: CPT | Mod: PBBFAC | Performed by: PEDIATRICS

## 2017-08-25 PROCEDURE — 99213 OFFICE O/P EST LOW 20 MIN: CPT | Mod: S$PBB,25,TH, | Performed by: OBSTETRICS & GYNECOLOGY

## 2017-08-25 PROCEDURE — 99999 PR PBB SHADOW E&M-EST. PATIENT-LVL II: CPT | Mod: PBBFAC,,, | Performed by: OBSTETRICS & GYNECOLOGY

## 2017-08-25 NOTE — PROGRESS NOTES
See viewpoint US report    Discussed results of US- confirmation of L club foot. No family history.

## 2017-08-25 NOTE — LETTER
August 25, 2017      Ava Patel MD  [address]  4th [address]  [facility] [address]           [facility]  [address]  [facility] [address]  Phone: [phone]          Patient: Anabel Serrato   MR Number: [id number]   YOB: 1990   Date of Visit: 8/25/2017       Dear Dr. Ava Patel:    Thank you for referring Anabel Serrato to me for evaluation. Attached you will find relevant portions of my assessment and plan of care.    If you have questions, please do not hesitate to call me. I look forward to following Anabel Serrato along with you.    Sincerely,    Matheus Wright MD    Enclosure  CC:  No Recipients    If you would like to receive this communication electronically, please contact [email or web address] or [phone] to request more information on [employer] Link access.    For providers and/or their staff who would like to refer a patient to [facility], please contact us through our one-stop-shop provider referral line, [facility], at [phone].    If you feel you have received this communication in error or would no longer like to receive these types of communications, please e-mail [email or web address]

## 2017-08-25 NOTE — LETTER
August 28, 2017      Jerri Hoover MD  [address]  [address]  [facility] [address]           [facility]  [address]  [facility] [address]  Phone: [phone]  Fax: [phone]          Patient: Anabel Serrato   MR Number: [id number]   YOB: 1990   Date of Visit: 8/25/2017       Dear Dr. Jerri Hoover:    Thank you for referring Anabel Serrato to me for evaluation. Attached you will find relevant portions of my assessment and plan of care.    If you have questions, please do not hesitate to call me. I look forward to following Anabel Serrato along with you.    Sincerely,    Pedro Tony  CC:  No Recipients    If you would like to receive this communication electronically, please contact [email or web address] or [phone] to request more information on Clink Link access.    For providers and/or their staff who would like to refer a patient to [facility], please contact us through our one-stop-shop provider referral line, [facility], at [phone].    If you feel you have received this communication in error or would no longer like to receive these types of communications, please e-mail [email or web address]

## 2017-08-28 NOTE — PROGRESS NOTES
"Ms. Serrato  is a 27 y.o. year old  , referred by Dr. Patel because of fetal clubbed foot (L).    The patient presented at approximately 22 3/7 weeks gestation.  The patient denied any complaints.    Past medical history: Unremarkable.  Past surgical history: Negative.  Past gestational history: The patient has two healthy sons (8 and 6 y/o).    Family history: Negative for congenital heart disease, and sudden death during childhood.    Medications:   Outpatient Encounter Prescriptions as of 2017   Medication Sig Dispense Refill    PNV no15-iron fum & ps cmp-FA (CONCEPT OB) 85-1 mg Cap Take 1 capsule by mouth Daily.       No facility-administered encounter medications on file as of 2017.        Allergies: Adhesive and No known drug allergies    Blood pressure (!) 102/58, pulse 84, height 5' 6.02" (1.677 m), weight 83.8 kg (184 lb 11.9 oz).    Fetal echocardiogram revealed a four chamber fetal heart with situs solitus.  The ventricles appeared to be equal in size.  The contractility of both ventricles was good.  The fetal heart rate was within the normal range, and regular.  The interventricular septum appeared to be intact.  There were normally related great arteries seen.  The ductal and aortic arch were well visualized, and appeared to be widely patent.  There was no pleural or pericardial effusion seen.    Doppler analysis revealed a three vessel umbilical cord, with normal flow patterns, and velocities by Doppler.  There was a normal flow pattern seen in the ductus venosus.  There was evidence of normal systemic, and pulmonary venous return seen.  There was a normal right to left shunt seen across the foramen ovale.  There were normal inflow patterns seen across the AV-valves, without significant insufficiency.  There was no ventricular level shunt seen.  The right and left ventricular outflow tract, and ductal and aortic arch appeared to be unobstructed.    Impression:  It is our impression " that Ms. Serrato had a normal fetal echocardiogram.  As you know, small defects, and coarctation of the aorta cannot always be ruled out on fetal echocardiogram.  We discussed our findings with the patient, reviewed our images, and answered her questions. We also discussed the limitations of fetal echocardiography, but emphasized that her child appears to have normal cardiac anatomy.  No further follow up is scheduled in our clinic, but, of course, we will always be available to reevaluate this patient, if needed.  Time spent: 30 minutes, 50% dedicated to counseling.

## 2017-08-29 ENCOUNTER — ROUTINE PRENATAL (OUTPATIENT)
Dept: OBSTETRICS AND GYNECOLOGY | Facility: CLINIC | Age: 27
End: 2017-08-29
Payer: MEDICAID

## 2017-08-29 VITALS
BODY MASS INDEX: 30.22 KG/M2 | SYSTOLIC BLOOD PRESSURE: 110 MMHG | WEIGHT: 187.38 LBS | DIASTOLIC BLOOD PRESSURE: 56 MMHG

## 2017-08-29 DIAGNOSIS — Z34.82 ENCOUNTER FOR SUPERVISION OF OTHER NORMAL PREGNANCY IN SECOND TRIMESTER: Primary | ICD-10-CM

## 2017-08-29 PROCEDURE — 3008F BODY MASS INDEX DOCD: CPT | Mod: ,,, | Performed by: STUDENT IN AN ORGANIZED HEALTH CARE EDUCATION/TRAINING PROGRAM

## 2017-08-29 PROCEDURE — 99213 OFFICE O/P EST LOW 20 MIN: CPT | Mod: TH,S$PBB,, | Performed by: STUDENT IN AN ORGANIZED HEALTH CARE EDUCATION/TRAINING PROGRAM

## 2017-08-29 PROCEDURE — 99212 OFFICE O/P EST SF 10 MIN: CPT | Mod: PBBFAC,PO | Performed by: STUDENT IN AN ORGANIZED HEALTH CARE EDUCATION/TRAINING PROGRAM

## 2017-08-29 PROCEDURE — 99999 PR PBB SHADOW E&M-EST. PATIENT-LVL II: CPT | Mod: PBBFAC,,, | Performed by: STUDENT IN AN ORGANIZED HEALTH CARE EDUCATION/TRAINING PROGRAM

## 2017-08-29 NOTE — PROGRESS NOTES
Complaints today: none  Good fm.  Denies ctx, vb, lof.  Fetal echo normal    BP (!) 110/56   Wt 85 kg (187 lb 6.3 oz)   BMI 30.22 kg/m²     27 y.o., at 23w4d by Estimated Date of Delivery: 17  Patient Active Problem List   Diagnosis    Supervision of normal pregnancy    Club foot of fetus affecting management of mother in jerome pregnancy, antepartum     OB History    Para Term  AB Living   3 2 2     2   SAB TAB Ectopic Multiple Live Births           2      # Outcome Date GA Lbr Ric/2nd Weight Sex Delivery Anes PTL Lv   3 Current            2 Term 12 41w0d    Vag-Spont   LATOSHA   1 Term 09   3.544 kg (7 lb 13 oz) M Vag-Spont  N LATOSHA          Dating reviewed    Allergies and problem list reviewed and updated    Medical and surgical history reviewed    Prenatal labs reviewed and updated    Physical Exam:  ABD: soft, gravid, nontender,     Assessment:  Anabel RODRIGUEZ was seen today for routine prenatal visit.    Diagnoses and all orders for this visit:    Encounter for supervision of other normal pregnancy in second trimester  -     CBC auto differential; Future  -     OB Glucose Screen; Future         Plan:   follow up labs   follow up 4 Weeks, kick counts, labor precautions

## 2017-09-22 ENCOUNTER — OFFICE VISIT (OUTPATIENT)
Dept: MATERNAL FETAL MEDICINE | Facility: CLINIC | Age: 27
End: 2017-09-22
Attending: OBSTETRICS & GYNECOLOGY
Payer: MEDICAID

## 2017-09-22 ENCOUNTER — LAB VISIT (OUTPATIENT)
Dept: LAB | Facility: OTHER | Age: 27
End: 2017-09-22
Attending: OBSTETRICS & GYNECOLOGY
Payer: MEDICAID

## 2017-09-22 DIAGNOSIS — Z36.89 ENCOUNTER FOR ULTRASOUND TO CHECK FETAL GROWTH: ICD-10-CM

## 2017-09-22 DIAGNOSIS — O35.9XX1 FETAL ABNORMALITY AFFECTING MANAGEMENT OF MOTHER, FETUS 1: ICD-10-CM

## 2017-09-22 DIAGNOSIS — O35.HXX0 CLUB FOOT OF FETUS AFFECTING MANAGEMENT OF MOTHER IN SINGLETON PREGNANCY, ANTEPARTUM: ICD-10-CM

## 2017-09-22 DIAGNOSIS — Z34.82 ENCOUNTER FOR SUPERVISION OF OTHER NORMAL PREGNANCY IN SECOND TRIMESTER: ICD-10-CM

## 2017-09-22 LAB
BASOPHILS # BLD AUTO: 0.01 K/UL
BASOPHILS NFR BLD: 0.1 %
DIFFERENTIAL METHOD: ABNORMAL
EOSINOPHIL # BLD AUTO: 0.2 K/UL
EOSINOPHIL NFR BLD: 1.4 %
ERYTHROCYTE [DISTWIDTH] IN BLOOD BY AUTOMATED COUNT: 13.2 %
GLUCOSE SERPL-MCNC: 110 MG/DL
HCT VFR BLD AUTO: 32.4 %
HGB BLD-MCNC: 10.8 G/DL
LYMPHOCYTES # BLD AUTO: 2 K/UL
LYMPHOCYTES NFR BLD: 15.2 %
MCH RBC QN AUTO: 32.3 PG
MCHC RBC AUTO-ENTMCNC: 33.3 G/DL
MCV RBC AUTO: 97 FL
MONOCYTES # BLD AUTO: 0.6 K/UL
MONOCYTES NFR BLD: 4.6 %
NEUTROPHILS # BLD AUTO: 10.1 K/UL
NEUTROPHILS NFR BLD: 76.7 %
PLATELET # BLD AUTO: 264 K/UL
PMV BLD AUTO: 9.7 FL
RBC # BLD AUTO: 3.34 M/UL
WBC # BLD AUTO: 13.14 K/UL

## 2017-09-22 PROCEDURE — 76816 OB US FOLLOW-UP PER FETUS: CPT | Mod: 26,S$PBB,, | Performed by: OBSTETRICS & GYNECOLOGY

## 2017-09-22 PROCEDURE — 99499 UNLISTED E&M SERVICE: CPT | Mod: S$PBB,,, | Performed by: OBSTETRICS & GYNECOLOGY

## 2017-09-22 PROCEDURE — 76816 OB US FOLLOW-UP PER FETUS: CPT | Mod: PBBFAC | Performed by: OBSTETRICS & GYNECOLOGY

## 2017-09-22 PROCEDURE — 36415 COLL VENOUS BLD VENIPUNCTURE: CPT

## 2017-09-22 PROCEDURE — 85025 COMPLETE CBC W/AUTO DIFF WBC: CPT

## 2017-09-22 PROCEDURE — 82950 GLUCOSE TEST: CPT

## 2017-09-22 NOTE — LETTER
September 22, 2017      Sushma K Reddy, MD  [address]  [facility] [address]           [facility]  [address]  [place] [address]  Phone: [phone]          Patient: Anabel Serrato   MR Number: [id number]   YOB: 1990   Date of Visit: 9/22/2017       Dear Dr. Sushma K Reddy:    Thank you for referring Anabel Serrato to me for evaluation. Attached you will find relevant portions of my assessment and plan of care.    If you have questions, please do not hesitate to call me. I look forward to following Anabel Serrato along with you.    Sincerely,    Matheus Wright MD    Enclosure  CC:  No Recipients    If you would like to receive this communication electronically, please contact [email or web address] or [phone] to request more information on [employer] Link access.    For providers and/or their staff who would like to refer a patient to [facility], please contact us through our one-stop-shop provider referral line, [facility], at [phone].    If you feel you have received this communication in error or would no longer like to receive these types of communications, please e-mail [email or web address]

## 2017-09-26 ENCOUNTER — ROUTINE PRENATAL (OUTPATIENT)
Dept: OBSTETRICS AND GYNECOLOGY | Facility: CLINIC | Age: 27
End: 2017-09-26
Payer: MEDICAID

## 2017-09-26 VITALS
DIASTOLIC BLOOD PRESSURE: 64 MMHG | SYSTOLIC BLOOD PRESSURE: 118 MMHG | BODY MASS INDEX: 32.11 KG/M2 | WEIGHT: 199.06 LBS

## 2017-09-26 DIAGNOSIS — Z34.82 ENCOUNTER FOR SUPERVISION OF OTHER NORMAL PREGNANCY IN SECOND TRIMESTER: Primary | ICD-10-CM

## 2017-09-26 DIAGNOSIS — O35.HXX0 CLUB FOOT OF FETUS AFFECTING MANAGEMENT OF MOTHER IN SINGLETON PREGNANCY, ANTEPARTUM: ICD-10-CM

## 2017-09-26 PROCEDURE — 99999 PR PBB SHADOW E&M-EST. PATIENT-LVL III: CPT | Mod: PBBFAC,,, | Performed by: STUDENT IN AN ORGANIZED HEALTH CARE EDUCATION/TRAINING PROGRAM

## 2017-09-26 PROCEDURE — 3008F BODY MASS INDEX DOCD: CPT | Mod: ,,, | Performed by: STUDENT IN AN ORGANIZED HEALTH CARE EDUCATION/TRAINING PROGRAM

## 2017-09-26 PROCEDURE — 99213 OFFICE O/P EST LOW 20 MIN: CPT | Mod: TH,S$PBB,, | Performed by: STUDENT IN AN ORGANIZED HEALTH CARE EDUCATION/TRAINING PROGRAM

## 2017-09-26 PROCEDURE — 99213 OFFICE O/P EST LOW 20 MIN: CPT | Mod: PBBFAC,PO | Performed by: STUDENT IN AN ORGANIZED HEALTH CARE EDUCATION/TRAINING PROGRAM

## 2017-09-26 NOTE — PROGRESS NOTES
Complaints today: Pressure, otherwise doing well. States she is interested in feeding her baby colostrum, but does not want to breastfeed further. She does want skin to skin. Denies ctx, vb, lof. Good FM.    /64   Wt 90.3 kg (199 lb 1.2 oz)   BMI 32.11 kg/m²     27 y.o., at 27w4d by Estimated Date of Delivery: 17  Patient Active Problem List   Diagnosis    Supervision of normal pregnancy    Club foot of fetus affecting management of mother in jerome pregnancy, antepartum     OB History    Para Term  AB Living   3 2 2     2   SAB TAB Ectopic Multiple Live Births           2      # Outcome Date GA Lbr Ric/2nd Weight Sex Delivery Anes PTL Lv   3 Current            2 Term 12 41w0d    Vag-Spont   LATOSHA   1 Term 09   3.544 kg (7 lb 13 oz) M Vag-Spont  N LATOSHA          Dating reviewed    Allergies and problem list reviewed and updated    Medical and surgical history reviewed    Prenatal labs reviewed and updated    Physical Exam:  ABD: soft, gravid, nontender, +FHTs    Assessment:  Routine OB    Plan:   Discussed breastfeeding, skin to skin  Flu shot at flu fair  Passed OB glucose  Is not taking PNV- will resume today    Caitlin Chilel MD  OB/GYN, PGY-2

## 2017-10-20 ENCOUNTER — TELEPHONE (OUTPATIENT)
Dept: OBSTETRICS AND GYNECOLOGY | Facility: CLINIC | Age: 27
End: 2017-10-20

## 2017-10-20 NOTE — TELEPHONE ENCOUNTER
----- Message from Lydia Gao sent at 10/20/2017 11:30 AM CDT -----  Contact: Anabel  Pt is 71/2 months pregnant and having stomach cramping. Pt can be reached at [phone]

## 2017-10-26 ENCOUNTER — ROUTINE PRENATAL (OUTPATIENT)
Dept: OBSTETRICS AND GYNECOLOGY | Facility: CLINIC | Age: 27
End: 2017-10-26
Payer: MEDICAID

## 2017-10-26 VITALS
WEIGHT: 209.44 LBS | DIASTOLIC BLOOD PRESSURE: 58 MMHG | BODY MASS INDEX: 33.78 KG/M2 | SYSTOLIC BLOOD PRESSURE: 110 MMHG

## 2017-10-26 DIAGNOSIS — Z20.828 EXPOSURE TO PARVOVIRUS: ICD-10-CM

## 2017-10-26 DIAGNOSIS — Z34.83 ENCOUNTER FOR SUPERVISION OF OTHER NORMAL PREGNANCY IN THIRD TRIMESTER: Primary | ICD-10-CM

## 2017-10-26 PROCEDURE — 99212 OFFICE O/P EST SF 10 MIN: CPT | Mod: PBBFAC,PO | Performed by: STUDENT IN AN ORGANIZED HEALTH CARE EDUCATION/TRAINING PROGRAM

## 2017-10-26 PROCEDURE — 99213 OFFICE O/P EST LOW 20 MIN: CPT | Mod: TH,S$PBB,, | Performed by: STUDENT IN AN ORGANIZED HEALTH CARE EDUCATION/TRAINING PROGRAM

## 2017-10-26 PROCEDURE — 99999 PR PBB SHADOW E&M-EST. PATIENT-LVL II: CPT | Mod: PBBFAC,,, | Performed by: STUDENT IN AN ORGANIZED HEALTH CARE EDUCATION/TRAINING PROGRAM

## 2017-10-26 NOTE — PROGRESS NOTES
Complaints today: son may have had parvo  Good fm.  Denies ctx, vb, lof     BP (!) 110/58   Wt 95 kg (209 lb 7 oz)   BMI 33.78 kg/m²     27 y.o., at 31w6d by Estimated Date of Delivery: 17  Patient Active Problem List   Diagnosis    Supervision of normal pregnancy/bottle/?btl/flu/tdap    Club foot of fetus affecting management of mother in jerome pregnancy, antepartum     OB History    Para Term  AB Living   3 2 2     2   SAB TAB Ectopic Multiple Live Births           2      # Outcome Date GA Lbr Ric/2nd Weight Sex Delivery Anes PTL Lv   3 Current            2 Term 12 41w0d    Vag-Spont   LATOSHA   1 Term 09   3.544 kg (7 lb 13 oz) M Vag-Spont  N LATOSHA          Dating reviewed    Allergies and problem list reviewed and updated    Medical and surgical history reviewed    Prenatal labs reviewed and updated    Physical Exam:  ABD: soft, gravid, nontender,     Assessment:  Anabel RODRIGUEZ was seen today for routine prenatal visit.    Diagnoses and all orders for this visit:    Encounter for supervision of other normal pregnancy in third trimester    Exposure to parvovirus  -     Parvovirus B19 antibody, IgG and IgM; Future    Other orders  -     Tdap Vaccine; Future         Plan:   follow up labs   follow up 2 Weeks, kick counts, labor precautions

## 2017-11-07 ENCOUNTER — CLINICAL SUPPORT (OUTPATIENT)
Dept: OBSTETRICS AND GYNECOLOGY | Facility: CLINIC | Age: 27
End: 2017-11-07
Attending: OBSTETRICS & GYNECOLOGY
Payer: MEDICAID

## 2017-11-07 ENCOUNTER — LAB VISIT (OUTPATIENT)
Dept: LAB | Facility: OTHER | Age: 27
End: 2017-11-07
Attending: OBSTETRICS & GYNECOLOGY
Payer: MEDICAID

## 2017-11-07 DIAGNOSIS — Z20.828 EXPOSURE TO PARVOVIRUS: ICD-10-CM

## 2017-11-07 DIAGNOSIS — Z23 NEEDS FLU SHOT: Primary | ICD-10-CM

## 2017-11-07 PROCEDURE — 86747 PARVOVIRUS ANTIBODY: CPT

## 2017-11-07 PROCEDURE — 90472 IMMUNIZATION ADMIN EACH ADD: CPT | Mod: PBBFAC

## 2017-11-07 PROCEDURE — 36415 COLL VENOUS BLD VENIPUNCTURE: CPT

## 2017-11-07 PROCEDURE — 99211 OFF/OP EST MAY X REQ PHY/QHP: CPT | Mod: PBBFAC

## 2017-11-07 PROCEDURE — 90471 IMMUNIZATION ADMIN: CPT | Mod: PBBFAC

## 2017-11-07 PROCEDURE — 99999 PR PBB SHADOW E&M-EST. PATIENT-LVL I: CPT | Mod: PBBFAC,,,

## 2017-11-07 NOTE — PROGRESS NOTES
Here for TDAP injection. Patient without complaint of pain at this time ,Injection given. Tolerated well. No pain noted after injection.  Advised to wait in lobby 15 minutes and report any adverse reactions.         Site: [facility]        Here for Influenza - Quadrivalent - PF (ADULT) vaccine . Vaccine Information sheet given to patient. Patient without complaint of pain prior to or after injection. Immunization tolerated well and patient advised to wait in lobby 15 minutes. Report any adverse reactions.      Site: [facility]

## 2017-11-09 ENCOUNTER — ROUTINE PRENATAL (OUTPATIENT)
Dept: OBSTETRICS AND GYNECOLOGY | Facility: CLINIC | Age: 27
End: 2017-11-09
Payer: MEDICAID

## 2017-11-09 VITALS
SYSTOLIC BLOOD PRESSURE: 124 MMHG | BODY MASS INDEX: 34.74 KG/M2 | WEIGHT: 215.38 LBS | DIASTOLIC BLOOD PRESSURE: 64 MMHG

## 2017-11-09 DIAGNOSIS — Z34.83 ENCOUNTER FOR SUPERVISION OF OTHER NORMAL PREGNANCY IN THIRD TRIMESTER: Primary | ICD-10-CM

## 2017-11-09 LAB
PARVOVIRUS B19 ABS IGG & IGM: NORMAL
PARVOVIRUS B19 IGG ANTIBODY: NEGATIVE
PARVOVIRUS B19 IGM ANTIBODY: NEGATIVE

## 2017-11-09 PROCEDURE — 99213 OFFICE O/P EST LOW 20 MIN: CPT | Mod: TH,S$PBB,, | Performed by: STUDENT IN AN ORGANIZED HEALTH CARE EDUCATION/TRAINING PROGRAM

## 2017-11-09 PROCEDURE — 99213 OFFICE O/P EST LOW 20 MIN: CPT | Mod: PBBFAC,PO | Performed by: STUDENT IN AN ORGANIZED HEALTH CARE EDUCATION/TRAINING PROGRAM

## 2017-11-09 PROCEDURE — 99999 PR PBB SHADOW E&M-EST. PATIENT-LVL III: CPT | Mod: PBBFAC,,, | Performed by: STUDENT IN AN ORGANIZED HEALTH CARE EDUCATION/TRAINING PROGRAM

## 2017-11-09 NOTE — PROGRESS NOTES
Complaints today: Occasional upper abdominal cramping, but no regular contractions. Denies vaginal bleeding, LOF. Good fm.    /64   Wt 97.7 kg (215 lb 6.2 oz)   BMI 34.74 kg/m²     27 y.o., at 33w6d by Estimated Date of Delivery: 17  Patient Active Problem List   Diagnosis    Supervision of normal pregnancy/bottle/?btl/flu/tdap    Club foot of fetus affecting management of mother in jerome pregnancy, antepartum     OB History    Para Term  AB Living   3 2 2     2   SAB TAB Ectopic Multiple Live Births           2      # Outcome Date GA Lbr Ric/2nd Weight Sex Delivery Anes PTL Lv   3 Current            2 Term 12 41w0d    Vag-Spont   LATOSHA   1 Term 09   3.544 kg (7 lb 13 oz) M Vag-Spont  N LATOSHA          Dating reviewed    Allergies and problem list reviewed and updated    Medical and surgical history reviewed    Prenatal labs reviewed and updated    Physical Exam:  ABD: soft, gravid, nontender    Assessment:  Anabel RODRIGUEZ was seen today for routine prenatal visit.    Diagnoses and all orders for this visit:    Encounter for supervision of other normal pregnancy in third trimester      Plan:   - BTL papers signed today. Copy given to patient.  - Follow up 2 weeks, kick counts, labor precautions

## 2017-11-21 ENCOUNTER — ROUTINE PRENATAL (OUTPATIENT)
Dept: OBSTETRICS AND GYNECOLOGY | Facility: CLINIC | Age: 27
End: 2017-11-21
Payer: MEDICAID

## 2017-11-21 VITALS
SYSTOLIC BLOOD PRESSURE: 120 MMHG | BODY MASS INDEX: 35.38 KG/M2 | WEIGHT: 219.38 LBS | DIASTOLIC BLOOD PRESSURE: 58 MMHG

## 2017-11-21 DIAGNOSIS — O35.HXX0 CLUB FOOT OF FETUS AFFECTING MANAGEMENT OF MOTHER IN SINGLETON PREGNANCY, ANTEPARTUM: ICD-10-CM

## 2017-11-21 DIAGNOSIS — Z34.83 ENCOUNTER FOR SUPERVISION OF OTHER NORMAL PREGNANCY IN THIRD TRIMESTER: Primary | ICD-10-CM

## 2017-11-21 PROCEDURE — 99213 OFFICE O/P EST LOW 20 MIN: CPT | Mod: TH,S$PBB,, | Performed by: STUDENT IN AN ORGANIZED HEALTH CARE EDUCATION/TRAINING PROGRAM

## 2017-11-21 PROCEDURE — 87081 CULTURE SCREEN ONLY: CPT

## 2017-11-21 PROCEDURE — 99213 OFFICE O/P EST LOW 20 MIN: CPT | Mod: PBBFAC,PO | Performed by: STUDENT IN AN ORGANIZED HEALTH CARE EDUCATION/TRAINING PROGRAM

## 2017-11-21 PROCEDURE — 99999 PR PBB SHADOW E&M-EST. PATIENT-LVL III: CPT | Mod: PBBFAC,,, | Performed by: STUDENT IN AN ORGANIZED HEALTH CARE EDUCATION/TRAINING PROGRAM

## 2017-11-21 NOTE — PROGRESS NOTES
Complaints today:Denies vaginal bleeding, contractions, loss of fluid. Confirms good fetal movement.   Denies preE ROS. Trace protein today.    BP (!) 120/58   Wt 99.5 kg (219 lb 5.7 oz)   BMI 35.38 kg/m²     27 y.o., at 35w4d by Estimated Date of Delivery: 17  Patient Active Problem List   Diagnosis    Supervision of normal pregnancy/bottle/?btl/flu/tdap    Club foot of fetus affecting management of mother in jerome pregnancy, antepartum     OB History    Para Term  AB Living   3 2 2     2   SAB TAB Ectopic Multiple Live Births           2      # Outcome Date GA Lbr Ric/2nd Weight Sex Delivery Anes PTL Lv   3 Current            2 Term 12 41w0d    Vag-Spont   LATOSHA   1 Term 09   3.544 kg (7 lb 13 oz) M Vag-Spont  N LATOSHA          Dating reviewed    Allergies and problem list reviewed and updated    Medical and surgical history reviewed    Prenatal labs reviewed and updated    Physical Exam:  ABD: soft, gravid, nontender,   Ext: no edema bilaterally in lower extremities     Assessment:  Anabel RODRIGUEZ was seen today for routine prenatal visit.    Diagnoses and all orders for this visit:    Encounter for supervision of other normal pregnancy in third trimester  -     CBC auto differential; Future  -     HIV-1 and HIV-2 antibodies; Future  -     RPR; Future  -     Strep B Screen, Vaginal / Rectal    Club foot of fetus affecting management of mother in jerome pregnancy, antepartum          Plan:   - s/p flu and tdap  - 3T and GBS today  - tubal consent signed, patient still deciding if wants tubal   follow up 1 Weeks, kick counts, labor precautions given

## 2017-11-23 LAB — BACTERIA SPEC AEROBE CULT: NORMAL

## 2017-11-24 ENCOUNTER — LAB VISIT (OUTPATIENT)
Dept: LAB | Facility: OTHER | Age: 27
End: 2017-11-24
Attending: STUDENT IN AN ORGANIZED HEALTH CARE EDUCATION/TRAINING PROGRAM
Payer: MEDICAID

## 2017-11-24 DIAGNOSIS — Z34.83 ENCOUNTER FOR SUPERVISION OF OTHER NORMAL PREGNANCY IN THIRD TRIMESTER: ICD-10-CM

## 2017-11-24 LAB
ANISOCYTOSIS BLD QL SMEAR: SLIGHT
BASOPHILS NFR BLD: 0 %
DIFFERENTIAL METHOD: ABNORMAL
EOSINOPHIL NFR BLD: 2 %
ERYTHROCYTE [DISTWIDTH] IN BLOOD BY AUTOMATED COUNT: 14.7 %
HCT VFR BLD AUTO: 34.6 %
HGB BLD-MCNC: 11.2 G/DL
LYMPHOCYTES NFR BLD: 15 %
MCH RBC QN AUTO: 30.5 PG
MCHC RBC AUTO-ENTMCNC: 32.4 G/DL
MCV RBC AUTO: 94 FL
MONOCYTES NFR BLD: 5 %
MYELOCYTES NFR BLD MANUAL: 1 %
NEUTROPHILS NFR BLD: 72 %
NEUTS BAND NFR BLD MANUAL: 5 %
PLATELET # BLD AUTO: 268 K/UL
PLATELET BLD QL SMEAR: ABNORMAL
PMV BLD AUTO: 9.8 FL
POLYCHROMASIA BLD QL SMEAR: ABNORMAL
RBC # BLD AUTO: 3.67 M/UL
WBC # BLD AUTO: 13.48 K/UL

## 2017-11-24 PROCEDURE — 86592 SYPHILIS TEST NON-TREP QUAL: CPT

## 2017-11-24 PROCEDURE — 86703 HIV-1/HIV-2 1 RESULT ANTBDY: CPT

## 2017-11-24 PROCEDURE — 36415 COLL VENOUS BLD VENIPUNCTURE: CPT

## 2017-11-27 LAB
HIV 1+2 AB+HIV1 P24 AG SERPL QL IA: NEGATIVE
RPR SER QL: NORMAL

## 2017-11-28 ENCOUNTER — ROUTINE PRENATAL (OUTPATIENT)
Dept: OBSTETRICS AND GYNECOLOGY | Facility: CLINIC | Age: 27
End: 2017-11-28
Payer: MEDICAID

## 2017-11-28 VITALS
WEIGHT: 223.56 LBS | SYSTOLIC BLOOD PRESSURE: 106 MMHG | DIASTOLIC BLOOD PRESSURE: 64 MMHG | BODY MASS INDEX: 36.06 KG/M2

## 2017-11-28 DIAGNOSIS — O35.HXX0 CLUB FOOT OF FETUS AFFECTING MANAGEMENT OF MOTHER IN SINGLETON PREGNANCY, ANTEPARTUM: ICD-10-CM

## 2017-11-28 DIAGNOSIS — Z34.83 ENCOUNTER FOR SUPERVISION OF OTHER NORMAL PREGNANCY IN THIRD TRIMESTER: Primary | ICD-10-CM

## 2017-11-28 PROCEDURE — 99212 OFFICE O/P EST SF 10 MIN: CPT | Mod: PBBFAC,PO | Performed by: STUDENT IN AN ORGANIZED HEALTH CARE EDUCATION/TRAINING PROGRAM

## 2017-11-28 PROCEDURE — 99999 PR PBB SHADOW E&M-EST. PATIENT-LVL II: CPT | Mod: PBBFAC,,, | Performed by: STUDENT IN AN ORGANIZED HEALTH CARE EDUCATION/TRAINING PROGRAM

## 2017-11-28 PROCEDURE — 99213 OFFICE O/P EST LOW 20 MIN: CPT | Mod: TH,S$PBB,, | Performed by: STUDENT IN AN ORGANIZED HEALTH CARE EDUCATION/TRAINING PROGRAM

## 2017-11-28 NOTE — PROGRESS NOTES
Complaints today: Decreased fetal movement. Denies contractions, vaginal bleeding. She has felt 3 movements today since 0600.    /64   Wt 101.4 kg (223 lb 8.7 oz)   BMI 36.06 kg/m²     27 y.o., at 36w4d by Estimated Date of Delivery: 17  Patient Active Problem List   Diagnosis    Supervision of normal pregnancy/bottle/?btl/flu/tdap    Club foot of fetus affecting management of mother in jerome pregnancy, antepartum     OB History    Para Term  AB Living   3 2 2     2   SAB TAB Ectopic Multiple Live Births           2      # Outcome Date GA Lbr Ric/2nd Weight Sex Delivery Anes PTL Lv   3 Current            2 Term 12 41w0d    Vag-Spont   LATOSHA   1 Term 09   3.544 kg (7 lb 13 oz) M Vag-Spont  N LATOSHA          Dating reviewed    Allergies and problem list reviewed and updated    Medical and surgical history reviewed    Prenatal labs reviewed and updated    Physical Exam:  ABD: soft, gravid, nontender. Several fetal movements palpated.    Assessment:  Routine OB    Plan:   Instructions given for kick counts after breakfast. Counseled patient to follow up at OB ED if movements do not improve  Labs reviewed     follow up 1 Weeks, kick counts, labor precautions given

## 2017-12-05 ENCOUNTER — ROUTINE PRENATAL (OUTPATIENT)
Dept: OBSTETRICS AND GYNECOLOGY | Facility: CLINIC | Age: 27
End: 2017-12-05
Payer: MEDICAID

## 2017-12-05 VITALS
WEIGHT: 223.75 LBS | SYSTOLIC BLOOD PRESSURE: 124 MMHG | DIASTOLIC BLOOD PRESSURE: 60 MMHG | BODY MASS INDEX: 36.09 KG/M2

## 2017-12-05 DIAGNOSIS — Z34.83 ENCOUNTER FOR SUPERVISION OF OTHER NORMAL PREGNANCY IN THIRD TRIMESTER: Primary | ICD-10-CM

## 2017-12-05 PROCEDURE — 99999 PR PBB SHADOW E&M-EST. PATIENT-LVL II: CPT | Mod: PBBFAC,,, | Performed by: STUDENT IN AN ORGANIZED HEALTH CARE EDUCATION/TRAINING PROGRAM

## 2017-12-05 PROCEDURE — 99213 OFFICE O/P EST LOW 20 MIN: CPT | Mod: TH,S$PBB,, | Performed by: STUDENT IN AN ORGANIZED HEALTH CARE EDUCATION/TRAINING PROGRAM

## 2017-12-05 PROCEDURE — 99212 OFFICE O/P EST SF 10 MIN: CPT | Mod: PBBFAC,PO | Performed by: STUDENT IN AN ORGANIZED HEALTH CARE EDUCATION/TRAINING PROGRAM

## 2017-12-05 NOTE — PROGRESS NOTES
Complaints today: none  Good fm.  Denies ctx, vb, lof     /60   Wt 101.5 kg (223 lb 12.3 oz)   BMI 36.09 kg/m²     27 y.o., at 37w4d by Estimated Date of Delivery: 17  Patient Active Problem List   Diagnosis    Supervision of normal pregnancy/bottle/?btl/flu/tdap    Club foot of fetus affecting management of mother in jerome pregnancy, antepartum     OB History    Para Term  AB Living   3 2 2     2   SAB TAB Ectopic Multiple Live Births           2      # Outcome Date GA Lbr Ric/2nd Weight Sex Delivery Anes PTL Lv   3 Current            2 Term 12 41w0d    Vag-Spont   LATOSHA   1 Term 09   3.544 kg (7 lb 13 oz) M Vag-Spont  N LATOSHA          Dating reviewed    Allergies and problem list reviewed and updated    Medical and surgical history reviewed    Prenatal labs reviewed and updated    Physical Exam:  ABD: soft, gravid, nontender,     Assessment:  Anabel RODRIGUEZ was seen today for routine prenatal visit.    Diagnoses and all orders for this visit:    Encounter for supervision of other normal pregnancy in third trimester         Plan:      follow up 1 Weeks, kick counts, labor precautions

## 2017-12-13 ENCOUNTER — ROUTINE PRENATAL (OUTPATIENT)
Dept: OBSTETRICS AND GYNECOLOGY | Facility: CLINIC | Age: 27
End: 2017-12-13
Payer: MEDICAID

## 2017-12-13 VITALS
SYSTOLIC BLOOD PRESSURE: 110 MMHG | BODY MASS INDEX: 36.73 KG/M2 | DIASTOLIC BLOOD PRESSURE: 60 MMHG | WEIGHT: 227.75 LBS

## 2017-12-13 DIAGNOSIS — Z34.83 ENCOUNTER FOR SUPERVISION OF OTHER NORMAL PREGNANCY IN THIRD TRIMESTER: Primary | ICD-10-CM

## 2017-12-13 PROCEDURE — 99213 OFFICE O/P EST LOW 20 MIN: CPT | Mod: TH,S$PBB,, | Performed by: STUDENT IN AN ORGANIZED HEALTH CARE EDUCATION/TRAINING PROGRAM

## 2017-12-13 PROCEDURE — 99212 OFFICE O/P EST SF 10 MIN: CPT | Mod: PBBFAC,PO | Performed by: STUDENT IN AN ORGANIZED HEALTH CARE EDUCATION/TRAINING PROGRAM

## 2017-12-13 PROCEDURE — 99999 PR PBB SHADOW E&M-EST. PATIENT-LVL II: CPT | Mod: PBBFAC,,, | Performed by: STUDENT IN AN ORGANIZED HEALTH CARE EDUCATION/TRAINING PROGRAM

## 2017-12-13 NOTE — PROGRESS NOTES
Patient seen by Dr. Rao.   See note from 12/13/2017.    Rick Steven M.D.  Obstetrics & Gynecology  PGY-1

## 2017-12-13 NOTE — PROGRESS NOTES
Chief Complaint   Patient presents with    Routine Prenatal Visit       27 y.o., at 38w5d by Estimated Date of Delivery: 17    Complaints today: Pressure and infrequent contractions    ROS  OBSTETRICS: No bleeding or loss of fluid.  +fetal movement.  GASTRO: No nausea or vomiting.  EXTREMITIES: No edema    OB History    Para Term  AB Living   3 2 2     2   SAB TAB Ectopic Multiple Live Births           2      # Outcome Date GA Lbr Ric/2nd Weight Sex Delivery Anes PTL Lv   3 Current            2 Term 12 41w0d    Vag-Spont   LATOSHA   1 Term 09   3.544 kg (7 lb 13 oz) M Vag-Spont  N LATOSHA          Dating reviewed  Allergies and problem list reviewed and updated  Medical and surgical history reviewed  Prenatal labs reviewed and updated    PHYSICAL EXAM  /60   Wt 103.3 kg (227 lb 11.8 oz)   BMI 36.73 kg/m²     GENERAL: No acute distress  NEURO: Alert and oriented x3  PSYCH: Normal mood and affect  PULMONARY: Non-labored respiration  ABDomen: Soft, gravid, nontender    ASSESSMENT AND PLAN    adriane Problems (from 17 to present)     Problem Noted Resolved    Club foot of fetus affecting management of mother in jerome pregnancy, antepartum 2017 by Matheus Wright MD No    Supervision of normal pregnancy/bottle/?btl/flu/tdap 2017 by Jerri Hoover MD No          Doing well.  PNL - Up to date.    Labor precautions given  Follow-up: 1 week

## 2017-12-19 ENCOUNTER — ROUTINE PRENATAL (OUTPATIENT)
Dept: OBSTETRICS AND GYNECOLOGY | Facility: CLINIC | Age: 27
End: 2017-12-19
Payer: MEDICAID

## 2017-12-19 VITALS
DIASTOLIC BLOOD PRESSURE: 70 MMHG | BODY MASS INDEX: 37.05 KG/M2 | WEIGHT: 229.75 LBS | SYSTOLIC BLOOD PRESSURE: 102 MMHG

## 2017-12-19 DIAGNOSIS — Z34.83 ENCOUNTER FOR SUPERVISION OF OTHER NORMAL PREGNANCY IN THIRD TRIMESTER: Primary | ICD-10-CM

## 2017-12-19 PROCEDURE — 99999 PR PBB SHADOW E&M-EST. PATIENT-LVL II: CPT | Mod: PBBFAC,,, | Performed by: STUDENT IN AN ORGANIZED HEALTH CARE EDUCATION/TRAINING PROGRAM

## 2017-12-19 PROCEDURE — 99213 OFFICE O/P EST LOW 20 MIN: CPT | Mod: TH,S$PBB,, | Performed by: STUDENT IN AN ORGANIZED HEALTH CARE EDUCATION/TRAINING PROGRAM

## 2017-12-19 PROCEDURE — 99212 OFFICE O/P EST SF 10 MIN: CPT | Mod: PBBFAC,PO | Performed by: STUDENT IN AN ORGANIZED HEALTH CARE EDUCATION/TRAINING PROGRAM

## 2017-12-19 NOTE — PROGRESS NOTES
Complaints today: none  Good fm.  Denies ctx, vb, lof     /70   Wt 104.2 kg (229 lb 11.5 oz)   BMI 37.05 kg/m²     27 y.o., at 39w4d by Estimated Date of Delivery: 17  Patient Active Problem List   Diagnosis    Supervision of normal pregnancy/bottle/?btl/flu/tdap    Club foot of fetus affecting management of mother in jerome pregnancy, antepartum     OB History    Para Term  AB Living   3 2 2     2   SAB TAB Ectopic Multiple Live Births           2      # Outcome Date GA Lbr Ric/2nd Weight Sex Delivery Anes PTL Lv   3 Current            2 Term 12 41w0d    Vag-Spont   LATOSHA   1 Term 09   3.544 kg (7 lb 13 oz) M Vag-Spont  N LATOSHA          Dating reviewed    Allergies and problem list reviewed and updated    Medical and surgical history reviewed    Prenatal labs reviewed and updated    Physical Exam:  ABD: soft, gravid, nontender,     Assessment:  Anabel RODRIGUEZ was seen today for routine prenatal visit.    Diagnoses and all orders for this visit:    Encounter for supervision of other normal pregnancy in third trimester         Plan:   Induction planned   follow up 1 Weeks, kick counts, labor precautions

## 2017-12-20 ENCOUNTER — PATIENT MESSAGE (OUTPATIENT)
Dept: OBSTETRICS AND GYNECOLOGY | Facility: CLINIC | Age: 27
End: 2017-12-20

## 2017-12-21 ENCOUNTER — PATIENT MESSAGE (OUTPATIENT)
Dept: OBSTETRICS AND GYNECOLOGY | Facility: CLINIC | Age: 27
End: 2017-12-21

## 2017-12-22 ENCOUNTER — ANESTHESIA EVENT (OUTPATIENT)
Dept: OBSTETRICS AND GYNECOLOGY | Facility: OTHER | Age: 27
End: 2017-12-22
Payer: MEDICAID

## 2017-12-22 ENCOUNTER — TELEPHONE (OUTPATIENT)
Dept: OBSTETRICS AND GYNECOLOGY | Facility: CLINIC | Age: 27
End: 2017-12-22

## 2017-12-22 ENCOUNTER — HOSPITAL ENCOUNTER (INPATIENT)
Facility: OTHER | Age: 27
LOS: 2 days | Discharge: HOME | End: 2017-12-24
Attending: OBSTETRICS & GYNECOLOGY | Admitting: OBSTETRICS & GYNECOLOGY
Payer: MEDICAID

## 2017-12-22 ENCOUNTER — ANESTHESIA (OUTPATIENT)
Dept: OBSTETRICS AND GYNECOLOGY | Facility: OTHER | Age: 27
End: 2017-12-22
Payer: MEDICAID

## 2017-12-22 DIAGNOSIS — Z37.9 NORMAL LABOR: ICD-10-CM

## 2017-12-22 DIAGNOSIS — O35.HXX0 CLUB FOOT OF FETUS AFFECTING MANAGEMENT OF MOTHER IN SINGLETON PREGNANCY, ANTEPARTUM: ICD-10-CM

## 2017-12-22 DIAGNOSIS — Z3A.40 40 WEEKS GESTATION OF PREGNANCY: ICD-10-CM

## 2017-12-22 DIAGNOSIS — Z30.09 UNWANTED FERTILITY: ICD-10-CM

## 2017-12-22 DIAGNOSIS — Z98.51 STATUS POST TUBAL LIGATION: ICD-10-CM

## 2017-12-22 LAB
ABO + RH BLD: NORMAL
ALLENS TEST: ABNORMAL
ANISOCYTOSIS BLD QL SMEAR: SLIGHT
BASOPHILS # BLD AUTO: ABNORMAL K/UL
BASOPHILS NFR BLD: 0 %
BLD GP AB SCN CELLS X3 SERPL QL: NORMAL
DIFFERENTIAL METHOD: ABNORMAL
EOSINOPHIL # BLD AUTO: ABNORMAL K/UL
EOSINOPHIL NFR BLD: 1 %
ERYTHROCYTE [DISTWIDTH] IN BLOOD BY AUTOMATED COUNT: 15.3 %
HCO3 UR-SCNC: 35 MMOL/L (ref 24–28)
HCT VFR BLD AUTO: 35.5 %
HGB BLD-MCNC: 11.7 G/DL
LYMPHOCYTES # BLD AUTO: ABNORMAL K/UL
LYMPHOCYTES NFR BLD: 10 %
MCH RBC QN AUTO: 29.8 PG
MCHC RBC AUTO-ENTMCNC: 33 G/DL
MCV RBC AUTO: 91 FL
MONOCYTES # BLD AUTO: ABNORMAL K/UL
MONOCYTES NFR BLD: 6 %
NEUTROPHILS NFR BLD: 75 %
NEUTS BAND NFR BLD MANUAL: 8 %
PCO2 BLDA: 71.7 MMHG (ref 35–45)
PH SMN: 7.3 [PH] (ref 7.35–7.45)
PLATELET # BLD AUTO: 257 K/UL
PLATELET BLD QL SMEAR: ABNORMAL
PMV BLD AUTO: 9.3 FL
PO2 BLDA: 17 MMHG (ref 80–100)
POC BE: 9 MMOL/L
POC SATURATED O2: 19 % (ref 95–100)
POLYCHROMASIA BLD QL SMEAR: ABNORMAL
RBC # BLD AUTO: 3.92 M/UL
SAMPLE: ABNORMAL
SITE: ABNORMAL
WBC # BLD AUTO: 16.66 K/UL

## 2017-12-22 PROCEDURE — 63600175 PHARM REV CODE 636 W HCPCS

## 2017-12-22 PROCEDURE — 99900035 HC TECH TIME PER 15 MIN (STAT)

## 2017-12-22 PROCEDURE — 59025 FETAL NON-STRESS TEST: CPT

## 2017-12-22 PROCEDURE — 85007 BL SMEAR W/DIFF WBC COUNT: CPT

## 2017-12-22 PROCEDURE — 25000003 PHARM REV CODE 250: Performed by: ANESTHESIOLOGY

## 2017-12-22 PROCEDURE — 72200005 HC VAGINAL DELIVERY LEVEL II

## 2017-12-22 PROCEDURE — 62326 NJX INTERLAMINAR LMBR/SAC: CPT | Performed by: ANESTHESIOLOGY

## 2017-12-22 PROCEDURE — 96372 THER/PROPH/DIAG INJ SC/IM: CPT

## 2017-12-22 PROCEDURE — 85027 COMPLETE CBC AUTOMATED: CPT

## 2017-12-22 PROCEDURE — 63600175 PHARM REV CODE 636 W HCPCS: Performed by: ANESTHESIOLOGY

## 2017-12-22 PROCEDURE — 36416 COLLJ CAPILLARY BLOOD SPEC: CPT

## 2017-12-22 PROCEDURE — 86900 BLOOD TYPING SEROLOGIC ABO: CPT

## 2017-12-22 PROCEDURE — 27800517 HC TRAY,EPIDURAL-CONTINUOUS: Performed by: ANESTHESIOLOGY

## 2017-12-22 PROCEDURE — 27200710 HC EPIDURAL INFUSION PUMP SET: Performed by: ANESTHESIOLOGY

## 2017-12-22 PROCEDURE — 51702 INSERT TEMP BLADDER CATH: CPT

## 2017-12-22 PROCEDURE — 63600175 PHARM REV CODE 636 W HCPCS: Performed by: OBSTETRICS & GYNECOLOGY

## 2017-12-22 PROCEDURE — 25000003 PHARM REV CODE 250: Performed by: OBSTETRICS & GYNECOLOGY

## 2017-12-22 PROCEDURE — 86901 BLOOD TYPING SEROLOGIC RH(D): CPT

## 2017-12-22 PROCEDURE — 59025 FETAL NON-STRESS TEST: CPT | Mod: 26,,, | Performed by: OBSTETRICS & GYNECOLOGY

## 2017-12-22 PROCEDURE — 11000001 HC ACUTE MED/SURG PRIVATE ROOM

## 2017-12-22 PROCEDURE — 0HQ9XZZ REPAIR PERINEUM SKIN, EXTERNAL APPROACH: ICD-10-PCS | Performed by: OBSTETRICS & GYNECOLOGY

## 2017-12-22 PROCEDURE — 59409 OBSTETRICAL CARE: CPT | Mod: AA,,, | Performed by: ANESTHESIOLOGY

## 2017-12-22 PROCEDURE — 99283 EMERGENCY DEPT VISIT LOW MDM: CPT | Mod: 25,,, | Performed by: OBSTETRICS & GYNECOLOGY

## 2017-12-22 PROCEDURE — 99285 EMERGENCY DEPT VISIT HI MDM: CPT | Mod: 25

## 2017-12-22 PROCEDURE — 10907ZC DRAINAGE OF AMNIOTIC FLUID, THERAPEUTIC FROM PRODUCTS OF CONCEPTION, VIA NATURAL OR ARTIFICIAL OPENING: ICD-10-PCS | Performed by: OBSTETRICS & GYNECOLOGY

## 2017-12-22 RX ORDER — FENTANYL/BUPIVACAINE/NS/PF 2MCG/ML-.1
PLASTIC BAG, INJECTION (ML) INJECTION CONTINUOUS
Status: DISCONTINUED | OUTPATIENT
Start: 2017-12-22 | End: 2017-12-22

## 2017-12-22 RX ORDER — BUPIVACAINE HYDROCHLORIDE 2.5 MG/ML
INJECTION, SOLUTION EPIDURAL; INFILTRATION; INTRACAUDAL
Status: DISPENSED
Start: 2017-12-22 | End: 2017-12-23

## 2017-12-22 RX ORDER — FENTANYL CITRATE 50 UG/ML
INJECTION, SOLUTION INTRAMUSCULAR; INTRAVENOUS
Status: COMPLETED
Start: 2017-12-22 | End: 2017-12-22

## 2017-12-22 RX ORDER — LIDOCAINE HYDROCHLORIDE AND EPINEPHRINE 15; 5 MG/ML; UG/ML
INJECTION, SOLUTION EPIDURAL
Status: DISCONTINUED | OUTPATIENT
Start: 2017-12-22 | End: 2017-12-22

## 2017-12-22 RX ORDER — CARBOPROST TROMETHAMINE 250 UG/ML
INJECTION, SOLUTION INTRAMUSCULAR
Status: DISCONTINUED
Start: 2017-12-22 | End: 2017-12-22 | Stop reason: WASHOUT

## 2017-12-22 RX ORDER — METHYLERGONOVINE MALEATE 0.2 MG/ML
INJECTION INTRAVENOUS
Status: DISPENSED
Start: 2017-12-22 | End: 2017-12-23

## 2017-12-22 RX ORDER — DIPHENHYDRAMINE HYDROCHLORIDE 50 MG/ML
25 INJECTION INTRAMUSCULAR; INTRAVENOUS EVERY 4 HOURS PRN
Status: DISCONTINUED | OUTPATIENT
Start: 2017-12-22 | End: 2017-12-24 | Stop reason: HOSPADM

## 2017-12-22 RX ORDER — IBUPROFEN 600 MG/1
600 TABLET ORAL EVERY 6 HOURS
Status: DISCONTINUED | OUTPATIENT
Start: 2017-12-23 | End: 2017-12-24 | Stop reason: HOSPADM

## 2017-12-22 RX ORDER — HYDROCORTISONE 25 MG/G
CREAM TOPICAL 3 TIMES DAILY PRN
Status: DISCONTINUED | OUTPATIENT
Start: 2017-12-22 | End: 2017-12-24 | Stop reason: HOSPADM

## 2017-12-22 RX ORDER — FAMOTIDINE 10 MG/ML
20 INJECTION INTRAVENOUS ONCE
Status: DISCONTINUED | OUTPATIENT
Start: 2017-12-22 | End: 2017-12-22

## 2017-12-22 RX ORDER — MISOPROSTOL 100 UG/1
TABLET ORAL
Status: DISCONTINUED
Start: 2017-12-22 | End: 2017-12-22 | Stop reason: WASHOUT

## 2017-12-22 RX ORDER — ONDANSETRON 8 MG/1
8 TABLET, ORALLY DISINTEGRATING ORAL EVERY 8 HOURS PRN
Status: DISCONTINUED | OUTPATIENT
Start: 2017-12-22 | End: 2017-12-24 | Stop reason: HOSPADM

## 2017-12-22 RX ORDER — MISOPROSTOL 200 UG/1
600 TABLET ORAL
Status: CANCELLED | OUTPATIENT
Start: 2017-12-22

## 2017-12-22 RX ORDER — SODIUM CHLORIDE 9 MG/ML
INJECTION, SOLUTION INTRAVENOUS
Status: DISCONTINUED | OUTPATIENT
Start: 2017-12-22 | End: 2017-12-24 | Stop reason: HOSPADM

## 2017-12-22 RX ORDER — SODIUM CHLORIDE, SODIUM LACTATE, POTASSIUM CHLORIDE, CALCIUM CHLORIDE 600; 310; 30; 20 MG/100ML; MG/100ML; MG/100ML; MG/100ML
INJECTION, SOLUTION INTRAVENOUS CONTINUOUS
Status: DISCONTINUED | OUTPATIENT
Start: 2017-12-22 | End: 2017-12-22

## 2017-12-22 RX ORDER — SODIUM CITRATE AND CITRIC ACID MONOHYDRATE 334; 500 MG/5ML; MG/5ML
30 SOLUTION ORAL ONCE
Status: DISCONTINUED | OUTPATIENT
Start: 2017-12-22 | End: 2017-12-22

## 2017-12-22 RX ORDER — FENTANYL/BUPIVACAINE/NS/PF 2MCG/ML-.1
PLASTIC BAG, INJECTION (ML) INJECTION
Status: DISPENSED
Start: 2017-12-22 | End: 2017-12-23

## 2017-12-22 RX ORDER — FENTANYL CITRATE 50 UG/ML
INJECTION, SOLUTION INTRAMUSCULAR; INTRAVENOUS
Status: DISCONTINUED | OUTPATIENT
Start: 2017-12-22 | End: 2017-12-22

## 2017-12-22 RX ORDER — METHYLERGONOVINE MALEATE 0.2 MG/ML
INJECTION INTRAVENOUS
Status: COMPLETED
Start: 2017-12-22 | End: 2017-12-22

## 2017-12-22 RX ORDER — HYDROCODONE BITARTRATE AND ACETAMINOPHEN 5; 325 MG/1; MG/1
1 TABLET ORAL EVERY 4 HOURS PRN
Status: DISCONTINUED | OUTPATIENT
Start: 2017-12-22 | End: 2017-12-24 | Stop reason: HOSPADM

## 2017-12-22 RX ORDER — HYDROCODONE BITARTRATE AND ACETAMINOPHEN 10; 325 MG/1; MG/1
1 TABLET ORAL EVERY 4 HOURS PRN
Status: DISCONTINUED | OUTPATIENT
Start: 2017-12-22 | End: 2017-12-24 | Stop reason: HOSPADM

## 2017-12-22 RX ORDER — FENTANYL/BUPIVACAINE/NS/PF 2MCG/ML-.1
PLASTIC BAG, INJECTION (ML) INJECTION CONTINUOUS PRN
Status: DISCONTINUED | OUTPATIENT
Start: 2017-12-22 | End: 2017-12-22

## 2017-12-22 RX ORDER — OXYTOCIN/RINGER'S LACTATE 20/1000 ML
41.65 PLASTIC BAG, INJECTION (ML) INTRAVENOUS CONTINUOUS
Status: ACTIVE | OUTPATIENT
Start: 2017-12-22 | End: 2017-12-23

## 2017-12-22 RX ORDER — OXYTOCIN/RINGER'S LACTATE 20/1000 ML
2 PLASTIC BAG, INJECTION (ML) INTRAVENOUS CONTINUOUS
Status: DISCONTINUED | OUTPATIENT
Start: 2017-12-22 | End: 2017-12-22

## 2017-12-22 RX ADMIN — Medication 5 ML: at 02:12

## 2017-12-22 RX ADMIN — HYDROCODONE BITARTRATE AND ACETAMINOPHEN 1 TABLET: 10; 325 TABLET ORAL at 11:12

## 2017-12-22 RX ADMIN — LIDOCAINE HYDROCHLORIDE,EPINEPHRINE BITARTRATE 3 ML: 15; .005 INJECTION, SOLUTION EPIDURAL; INFILTRATION; INTRACAUDAL; PERINEURAL at 02:12

## 2017-12-22 RX ADMIN — IBUPROFEN 600 MG: 600 TABLET, FILM COATED ORAL at 11:12

## 2017-12-22 RX ADMIN — METHYLERGONOVINE MALEATE 200 MCG: 0.2 INJECTION, SOLUTION INTRAMUSCULAR; INTRAVENOUS at 10:12

## 2017-12-22 RX ADMIN — FENTANYL CITRATE 100 MCG: 50 INJECTION, SOLUTION INTRAMUSCULAR; INTRAVENOUS at 02:12

## 2017-12-22 RX ADMIN — Medication 2 MILLI-UNITS/MIN: at 08:12

## 2017-12-22 RX ADMIN — Medication 10 ML/HR: at 02:12

## 2017-12-22 RX ADMIN — Medication 41.65 MILLI-UNITS/MIN: at 10:12

## 2017-12-22 NOTE — TELEPHONE ENCOUNTER
----- Message from Nick Garza sent at 12/22/2017 12:48 PM CST -----  Ob pt states that she is having contraction every 9min to 10min. Pt can be reached at [phone].

## 2017-12-22 NOTE — HPI
Anabel Serrato is a 27 y.o. [id number] at 40w0d presenting with uterine contractions, currently every 8 minutes. Patient reports losing her mucous plug today with some spotting. No gush of fluid. Normal fetal movement. This IUP is complicated by fetal left club foot. Patient desires postpartum tubal.

## 2017-12-22 NOTE — ANESTHESIA PROCEDURE NOTES
Epidural    Patient location during procedure: OB   Reason for block: primary anesthetic   Diagnosis: IUP   Start time: 12/22/2017 2:40 PM  Timeout: 12/22/2017 2:39 PM  End time: 12/22/2017 2:51 PM  Staffing  Anesthesiologist: ROULA REEVES  Resident/CRNA: REY GOODSON  Performed: resident/CRNA   Preanesthetic Checklist  Completed: patient identified, surgical consent, pre-op evaluation, timeout performed, IV checked, risks and benefits discussed, monitors and equipment checked, anesthesia consent given, hand hygiene performed and patient being monitored  Preparation  Patient position: sitting  Prep: ChloraPrep  Patient monitoring: Pulse Ox and Blood Pressure  Epidural  Administration type: continuous  Approach: midline  Interspace: L3-4  Injection technique: TANNER air  Needle and Epidural Catheter  Needle type: Tuohy   Needle gauge: 18  Needle length: 3.5 inches  Needle insertion depth: 6.5 cm  Catheter type: springwound and multi-orifice  Catheter size: 19 G  Catheter at skin depth: 10 cm  Test dose: 3 mL of lidocaine 1.5% with Epi 1-to-200,000  Additional Documentation: incremental injection, negative aspiration for heme and CSF, no paresthesia on injection, no signs/symptoms of IV or SA injection, no significant complaints from patient and no significant pain on injection  Needle localization: anatomical landmarks  Medications:  Bolus administered: 10 mL of 0.125% bupivacaine  Opioid administered: 100 mcg of   fentanyl  Volume per aspiration: 5 mL  Time between aspirations: 5 minutes  Assessment  Lower dermatomal level: T6   Dermatomal levels determined by alcohol wipe  Ease of block: easy  Patient's tolerance of the procedure: comfortable throughout block  Post dural Puncture Headache?: No

## 2017-12-22 NOTE — ANESTHESIA PREPROCEDURE EVALUATION
Anabel Serrato is a 27 y.o.  female at 40W admitted with contractions.    This pregnancy complicated by fetal L club foot and current smoking.    OB History    Para Term  AB Living   3 2 2     2   SAB TAB Ectopic Multiple Live Births           2      # Outcome Date GA Lbr Ric/2nd Weight Sex Delivery Anes PTL Lv   3 Current            2 Term 12 41w0d    Vag-Spont   LATOSHA   1 Term 09   3.544 kg (7 lb 13 oz) M Vag-Spont  N LATOSHA          Wt Readings from Last 1 Encounters:   17 1357 103.9 kg (229 lb)       BP Readings from Last 3 Encounters:   17 125/65   17 102/70   17 110/60       Patient Active Problem List   Diagnosis    Supervision of normal pregnancy/bottle/?btl/flu/tdap    Club foot of fetus affecting management of mother in jerome pregnancy, antepartum    Normal labor       Past Surgical History:   Procedure Laterality Date    episitomy  2009    during delivery of her son       Social History     Social History    Marital status: Significant Other     Spouse name: N/A    Number of children: N/A    Years of education: N/A     Occupational History    Not on file.     Social History Main Topics    Smoking status: Current Every Day Smoker     Years: 7.00     Types: Cigarettes    Smokeless tobacco: Never Used      Comment: Pt smokes 2 cigarettes today    Alcohol use No    Drug use: No    Sexual activity: Yes     Partners: Male     Other Topics Concern    Not on file     Social History Narrative    FOB: [place] had hole in heart closed on own         Chemistry        Component Value Date/Time     2017 1108    K 4.2 2017 1108     2017 1108    CO2 24 2017 1108    BUN 9 2017 1108    CREATININE 0.9 2017 1108    GLU 93 2017 1108        Component Value Date/Time    CALCIUM 10.2 2017 1108    ALKPHOS 62 2017 1108    AST 17 2017 1108    ALT 12 2017 1108     BILITOT 0.4 05/02/2017 1108    ESTGFRAFRICA >60.0 05/02/2017 1108    EGFRNONAA >60.0 05/02/2017 1108            Lab Results   Component Value Date    WBC 13.48 (H) 11/24/2017    HGB 11.2 (L) 11/24/2017    HCT 34.6 (L) 11/24/2017    MCV 94 11/24/2017     11/24/2017       No results for input(s): PT, INR, PROTIME, APTT in the last 72 hours.                  Anesthesia Evaluation    I have reviewed the Patient Summary Reports.    I have reviewed the Nursing Notes.   I have reviewed the Medications.     Review of Systems  Anesthesia Hx:  Neg history of prior surgery. Denies Family Hx of Anesthesia complications.   Denies Personal Hx of Anesthesia complications. (hx of epidurals x 2 without issue)   Hematology/Oncology:     Oncology Normal    -- Anemia:   EENT/Dental:EENT/Dental Normal   Cardiovascular:  Cardiovascular Normal     Pulmonary:  Pulmonary Normal    Renal/:  Renal/ Normal     Hepatic/GI:  Hepatic/GI Normal    Neurological:  Neurology Normal    Endocrine:  Endocrine Normal    Psych:  Psychiatric Normal           Physical Exam  General:  Well nourished    Airway/Jaw/Neck:  AIRWAY FINDINGS: Normal    Eyes/Ears/Nose:  EYES/EARS/NOSE FINDINGS: Normal   Dental:  DENTAL FINDINGS: Normal   Chest/Lungs:  Chest/Lungs Clear    Heart/Vascular:  Heart Findings: Normal    Abdomen:  Abdomen Findings: (Iup)     Musculoskeletal:  Musculoskeletal Findings: Normal   Skin:  Skin Findings: Normal    Mental Status:  Mental Status Findings: Normal        Anesthesia Plan  Type of Anesthesia, risks & benefits discussed:  Anesthesia Type:  CSE, epidural, spinal  Patient's Preference:   Intra-op Monitoring Plan: standard ASA monitors  Intra-op Monitoring Plan Comments:   Post Op Pain Control Plan: multimodal analgesia  Post Op Pain Control Plan Comments:   Induction:   IV  Beta Blocker:  Patient is not currently on a Beta-Blocker (No further documentation required).       Informed Consent: Patient understands risks and  agrees with Anesthesia plan.  Questions answered. Anesthesia consent signed with patient.  ASA Score: 2     Day of Surgery Review of History & Physical:  There are no significant changes.          Ready For Surgery From Anesthesia Perspective.

## 2017-12-22 NOTE — ED TRIAGE NOTES
"Pt presents to [facility] with complaints of contractions every 8 min for 2 hours and "lost my mucous plug". Pt shown to room 3, dr muñoz notified  "

## 2017-12-22 NOTE — ASSESSMENT & PLAN NOTE
- Consents signed and to chart  - Admit to Labor and Delivery unit  - Plan for active labor management    - Epidural per Anesthesia  - Draw CBC, T&S  - Notify Staff  - Ultrasound performed, fetus in vertex position.  - Recheck in 2 hrs or PRN  - Post-Partum Hemorrhage risk - low

## 2017-12-22 NOTE — H&P
[facility]  Obstetrics  History & Physical    Patient Name: Anabel Serrato  MRN: [id number]  Admission Date: 2017  Primary Care Provider: Primary Doctor No    Subjective:     Principal Problem:Normal labor    History of Present Illness:  Anabel Serrato is a 27 y.o. [id number] at 40w0d presenting with uterine contractions, currently every 8 minutes. Patient reports losing her mucous plug today with some spotting. No gush of fluid. Normal fetal movement. This IUP is complicated by fetal left club foot. Patient desires postpartum tubal.    Obstetric HPI:    Obstetric History       T2      L2     SAB0   TAB0   Ectopic0   Multiple0   Live Births2       # Outcome Date GA Lbr Ric/2nd Weight Sex Delivery Anes PTL Lv   3 Current            2 Term 12 41w0d    Vag-Spont   LATOSHA   1 Term 09   3.544 kg (7 lb 13 oz) M Vag-Spont  N LATOSHA        History reviewed. No pertinent past medical history.  Past Surgical History:   Procedure Laterality Date    episitomy  2009    during delivery of her son       PTA Medications   Medication Sig    PNV/FERROUS SULFATE/FOLIC ACID (PRENATAL MULTIVIT WITH IRON ORAL) Take 1 tablet by mouth once daily.       Review of patient's allergies indicates:   Allergen Reactions    Adhesive Blisters     Can remove skin    No known drug allergies         Family History     Problem Relation (Age of Onset)    Breast cancer Maternal Grandmother        Social History Main Topics    Smoking status: Current Every Day Smoker     Years: 7.00     Types: Cigarettes    Smokeless tobacco: Never Used      Comment: Pt smokes 2 cigarettes today    Alcohol use No    Drug use: No    Sexual activity: Yes     Partners: Male     Review of Systems   Constitutional: Negative for chills and fever.   HENT: Negative for congestion.    Eyes: Negative for visual disturbance.   Respiratory: Negative for shortness of breath.    Cardiovascular: Negative for chest pain.    Gastrointestinal: Positive for abdominal pain. Negative for diarrhea, nausea and vomiting.   Genitourinary: Positive for vaginal discharge. Negative for dysuria, hematuria and vaginal bleeding.   Musculoskeletal: Positive for back pain.   Skin: Negative for rash.   Neurological: Negative for dizziness and headaches.   Objective:     Vital Signs (Most Recent):  Temp: 97.3 °F (36.3 °C) (12/22/17 1357)  Pulse: 110 (12/22/17 1357)  Resp: 18 (12/22/17 1357)  BP: 125/65 (12/22/17 1357)  SpO2: 95 % (12/22/17 1357) Vital Signs (24h Range):  Temp:  [97.3 °F (36.3 °C)] 97.3 °F (36.3 °C)  Pulse:  [110] 110  Resp:  [18] 18  SpO2:  [95 %] 95 %  BP: (125)/(65) 125/65     Weight: 103.9 kg (229 lb)  Body mass index is 39.31 kg/m².    Obtain Fetal nonstress test (NST)  Date/Time: 12/22/2017 2:01 PM  Performed by: ASHISH MCBRIDE  Authorized by: CONTRERAS SQUIRES   Nonstress Test:     Variability:  <5 BPM    Decelerations:  None    Accelerations:  Absent    Baseline:  160    Uterine Irritability: No      Contraction Frequency:  Q 3-6 min  Biophysical Profile:     Nonstress Test Interpretation: nonreactive      Overall Impression:  Reassuring  Post-procedure:      Cat 2 strip    Physical Exam  Vitals reviewed.  Constitutional: She appears well-developed and well-nourished. She is not diaphoretic. No distress.   HENT:   Head: Normocephalic and atraumatic.   Eyes: EOM are normal.   Cardiovascular: Normal rate.   Pulmonary/Chest: No respiratory distress.   Abdominal: Soft. She exhibits no distension. There is no tenderness. There is no rebound and no guarding.   Musculoskeletal: Normal range of motion.   Neurological: She is alert and oriented to person, place, and time.   Skin: Skin is warm and dry.   Psychiatric: She has a normal mood and affect. Her behavior is normal. Judgment and thought content normal.      Cervix:  Dilation:  5  Effacement:  60%  Station: -2  Presentation: Vertex     Significant Labs:  Lab Results   Component  Value Date    GROUPTRH O POS 2017    HEPBSAG Negative 2017    STREPBCULT No Group B Streptococcus isolated 2017       I have personallly reviewed all pertinent lab results from the last 24 hours.    Assessment/Plan:     27 y.o. female  at 40w0d for:    * Normal labor    - Consents signed and to chart  - Admit to Labor and Delivery unit  - Plan for active labor management    - Epidural per Anesthesia  - Draw CBC, T&S  - Notify Staff  - Ultrasound performed, fetus in vertex position.  - Recheck in 2 hrs or PRN  - Post-Partum Hemorrhage risk - low              Unwanted fertility    - desires ppBTL, consents signed            Willian Gomez MD  Obstetrics  [facility]

## 2017-12-22 NOTE — SUBJECTIVE & OBJECTIVE
Obstetric HPI:    Obstetric History       T2      L2     SAB0   TAB0   Ectopic0   Multiple0   Live Births2       # Outcome Date GA Lbr Ric/2nd Weight Sex Delivery Anes PTL Lv   3 Current            2 Term 12 41w0d    Vag-Spont   LATOSHA   1 Term 09   3.544 kg (7 lb 13 oz) M Vag-Spont  N LATOSHA        History reviewed. No pertinent past medical history.  Past Surgical History:   Procedure Laterality Date    episitomy  2009    during delivery of her son       PTA Medications   Medication Sig    PNV/FERROUS SULFATE/FOLIC ACID (PRENATAL MULTIVIT WITH IRON ORAL) Take 1 tablet by mouth once daily.       Review of patient's allergies indicates:   Allergen Reactions    Adhesive Blisters     Can remove skin    No known drug allergies         Family History     Problem Relation (Age of Onset)    Breast cancer Maternal Grandmother        Social History Main Topics    Smoking status: Current Every Day Smoker     Years: 7.00     Types: Cigarettes    Smokeless tobacco: Never Used      Comment: Pt smokes 2 cigarettes today    Alcohol use No    Drug use: No    Sexual activity: Yes     Partners: Male     Review of Systems   Constitutional: Negative for chills and fever.   HENT: Negative for congestion.    Eyes: Negative for visual disturbance.   Respiratory: Negative for shortness of breath.    Cardiovascular: Negative for chest pain.   Gastrointestinal: Positive for abdominal pain. Negative for diarrhea, nausea and vomiting.   Genitourinary: Positive for vaginal discharge. Negative for dysuria, hematuria and vaginal bleeding.   Musculoskeletal: Positive for back pain.   Skin: Negative for rash.   Neurological: Negative for dizziness and headaches.   Objective:     Vital Signs (Most Recent):  Temp: 97.3 °F (36.3 °C) (17 1357)  Pulse: 110 (17 1357)  Resp: 18 (17 1357)  BP: 125/65 (17 1357)  SpO2: 95 % (17 1357) Vital Signs (24h Range):  Temp:  [97.3 °F (36.3 °C)] 97.3 °F  (36.3 °C)  Pulse:  [110] 110  Resp:  [18] 18  SpO2:  [95 %] 95 %  BP: (125)/(65) 125/65     Weight: 103.9 kg (229 lb)  Body mass index is 39.31 kg/m².    Obtain Fetal nonstress test (NST)  Date/Time: 12/22/2017 2:01 PM  Performed by: ASHISH MCBRIDE  Authorized by: CONTRERAS SQUIRES   Nonstress Test:     Variability:  <5 BPM    Decelerations:  None    Accelerations:  Absent    Baseline:  160    Uterine Irritability: No      Contraction Frequency:  Q 3-6 min  Biophysical Profile:     Nonstress Test Interpretation: nonreactive      Overall Impression:  Reassuring  Post-procedure:      Cat 2 strip    Physical Exam  Vitals reviewed.  Constitutional: She appears well-developed and well-nourished. She is not diaphoretic. No distress.   HENT:   Head: Normocephalic and atraumatic.   Eyes: EOM are normal.   Cardiovascular: Normal rate.   Pulmonary/Chest: No respiratory distress.   Abdominal: Soft. She exhibits no distension. There is no tenderness. There is no rebound and no guarding.   Musculoskeletal: Normal range of motion.   Neurological: She is alert and oriented to person, place, and time.   Skin: Skin is warm and dry.   Psychiatric: She has a normal mood and affect. Her behavior is normal. Judgment and thought content normal.     Cervix:  Dilation:  5  Effacement:  60%  Station: -2  Presentation: Vertex     Significant Labs:  Lab Results   Component Value Date    GROUPTRH O POS 05/02/2017    HEPBSAG Negative 05/24/2017    STREPBCULT No Group B Streptococcus isolated 11/21/2017       I have personallly reviewed all pertinent lab results from the last 24 hours.

## 2017-12-22 NOTE — PROGRESS NOTES
LABOR PROGRESS NOTE    S:  MD to bedside for cervical exam. Complaints: No.  Epidural in place and working    O: Temp:  [97.3 °F (36.3 °C)] 97.3 °F (36.3 °C)  Pulse:  [110] 110  Resp:  [18] 18  SpO2:  [95 %] 95 %  BP: (125)/(65) 125/65    FHT: 140 BPM/moderte beat to beat variability/+accels/-decels Cat 1 (reassuring)  CTX: q 2-3 minutes, pitocin not ordered  SVE: /-2, AROM, clear    ASSESSMENT:   27 y.o.  at 40w0d, in labor at term, AROM clear @ 1600    FHT reassuring    Active Hospital Problems    Diagnosis  POA    *Normal labor [O80, Z37.9]  Not Applicable    Unwanted fertility [Z30.09]  Unknown      Resolved Hospital Problems    Diagnosis Date Resolved POA   No resolved problems to display.     PLAN:    Labor management  Continue Close Maternal/Fetal Monitoring.   Pitocin Augmentation per protocol, not ordered at this time  Making change without meds  AROM, clear  Reassuring strip  Recheck 2 hours or PRN    Livia Silva MD, PhD  OBGYN, PGY-2  Livia Silva MD

## 2017-12-22 NOTE — HOSPITAL COURSE
2017: Admitted in labor, spontaneous delivery with no augmentation. No complications.  2017: PPD#1 s/p , meeting postpartum goals. BTL done with no complications.  2017: PPD#2/ POD#1 s/p  and BTL. Doing well this morning. Meeting post-op and postpartum goals.

## 2017-12-22 NOTE — ED PROVIDER NOTES
Encounter Date: 2017       History     Chief Complaint   Patient presents with    Contractions     Anabel Serrato is a 27 y.o. [id number] at 40w0d presenting with uterine contractions, currently every 8 minutes. Patient reports losing her mucous plug today with some spotting. No gush of fluid. Normal fetal movement. This IUP is complicated by fetal left club foot. Patient desires postpartum tubal.          Review of patient's allergies indicates:   Allergen Reactions    Adhesive Blisters     Can remove skin    No known drug allergies      History reviewed. No pertinent past medical history.  Past Surgical History:   Procedure Laterality Date    episitomy  2009    during delivery of her son     Family History   Problem Relation Age of Onset    Breast cancer Maternal Grandmother     Cancer Neg Hx     Hypertension Neg Hx     Arrhythmia Neg Hx     Cardiomyopathy Neg Hx     Early death Neg Hx     Heart attacks under age 50 Neg Hx     Premature birth Neg Hx      Social History   Substance Use Topics    Smoking status: Current Every Day Smoker     Years: 7.00     Types: Cigarettes    Smokeless tobacco: Never Used      Comment: Pt smokes 2 cigarettes today    Alcohol use No     Review of Systems   Constitutional: Negative for chills and fever.   HENT: Negative for congestion.    Eyes: Negative for visual disturbance.   Respiratory: Negative for shortness of breath.    Cardiovascular: Negative for chest pain.   Gastrointestinal: Positive for abdominal pain. Negative for diarrhea, nausea and vomiting.   Genitourinary: Positive for vaginal discharge. Negative for dysuria, hematuria and vaginal bleeding.   Musculoskeletal: Positive for back pain.   Skin: Negative for rash.   Neurological: Negative for dizziness and headaches.       Physical Exam   Temp:  [97.3 °F (36.3 °C)] 97.3 °F (36.3 °C)  Pulse:  [110] 110  Resp:  [18] 18  SpO2:  [95 %] 95 %  BP: (125)/(65) 125/65    Physical Exam    Vitals  reviewed.  Constitutional: She appears well-developed and well-nourished. She is not diaphoretic. No distress.   HENT:   Head: Normocephalic and atraumatic.   Eyes: EOM are normal.   Neck: Neck supple.   Cardiovascular: Normal rate.   Pulmonary/Chest: No respiratory distress.   Abdominal: Soft. She exhibits no distension. There is no tenderness. There is no rebound and no guarding.   Gravid, fundus NT   Musculoskeletal: Normal range of motion.   Neurological: She is alert and oriented to person, place, and time.   Skin: Skin is warm and dry.   Psychiatric: She has a normal mood and affect. Her behavior is normal. Judgment and thought content normal.     OB LABOR EXAM:   Pre-Term Labor: No.   Membranes ruptured: No.   Method: Sterile vaginal exam per MD.   Vaginal Bleeding: none present.     Dilatation: 5.   Station: -2.   Effacement: 60%.   Amniotic Fluid Color: no fluid.   Amniotic Fluid Amount: none noted.         ED Course   Obtain Fetal nonstress test (NST)  Date/Time: 12/22/2017 2:01 PM  Performed by: ASHISH MCBRIDE  Authorized by: CONTRERAS SQUIRES     Nonstress Test:     Variability:  <5 BPM    Decelerations:  None    Accelerations:  Absent    Baseline:  160    Uterine Irritability: No      Contraction Frequency:  Q 3-6 min  Biophysical Profile:     Nonstress Test Interpretation: nonreactive      Overall Impression:  Reassuring  Post-procedure:      Cat 2 strip        Labs Reviewed - No data to display          Medical Decision Making:   ED Management:  Admit to L&D: 5 cm dilated w Cat 2 strip              Attending Attestation:   Physician Attestation Statement for Resident:  As the supervising MD   Physician Attestation Statement: I have personally seen and examined this patient.   I agree with the above history. -:   As the supervising MD I agree with the above PE.    As the supervising MD I agree with the above treatment, course, plan, and disposition.  I was personally present during the critical  portions of the procedure(s) performed by the resident and was immediately available in the ED to provide services and assistance as needed during the entire procedure.  I have reviewed and agree with the residents interpretation of the following: rhythm strips.  I have reviewed the following: old records at this facility.                    ED Course as of Dec 22 1442   Fri Dec 22, 2017   1413 I evaluated Ms. Serrato and discussed plan with Dr. Gomez.  Pt presents at 40 weeks who presents with contractions.  She is found to be 5cm with category 2 NST.  Will admit to labor and delviery  []      ED Course User Index  [HU] Juli Jasso DO     Clinical Impression:   The primary encounter diagnosis was 40 weeks gestation of pregnancy. A diagnosis of Normal labor was also pertinent to this visit.    Disposition:   Disposition: Admitted  Condition: Stable    Willian Gomez MD  PGY1, OBGYN [facility]                   Willian Gomez MD  Resident  12/22/17 1444       Juli Jasso DO  12/23/17 0110

## 2017-12-23 ENCOUNTER — SURGERY (OUTPATIENT)
Age: 27
End: 2017-12-23

## 2017-12-23 ENCOUNTER — ANESTHESIA (OUTPATIENT)
Dept: SURGERY | Facility: OTHER | Age: 27
End: 2017-12-23
Payer: MEDICAID

## 2017-12-23 ENCOUNTER — ANESTHESIA EVENT (OUTPATIENT)
Dept: SURGERY | Facility: OTHER | Age: 27
End: 2017-12-23
Payer: MEDICAID

## 2017-12-23 PROBLEM — Z37.9 NORMAL LABOR: Status: RESOLVED | Noted: 2017-12-22 | Resolved: 2017-12-23

## 2017-12-23 PROBLEM — Z34.90 SUPERVISION OF NORMAL PREGNANCY: Status: RESOLVED | Noted: 2017-05-24 | Resolved: 2017-12-23

## 2017-12-23 PROBLEM — Z3A.40 40 WEEKS GESTATION OF PREGNANCY: Status: RESOLVED | Noted: 2017-12-22 | Resolved: 2017-12-23

## 2017-12-23 LAB
BASOPHILS # BLD AUTO: 0.02 K/UL
BASOPHILS NFR BLD: 0.1 %
DIFFERENTIAL METHOD: ABNORMAL
EOSINOPHIL # BLD AUTO: 0.2 K/UL
EOSINOPHIL NFR BLD: 0.9 %
ERYTHROCYTE [DISTWIDTH] IN BLOOD BY AUTOMATED COUNT: 15.4 %
HCT VFR BLD AUTO: 29.8 %
HGB BLD-MCNC: 9.8 G/DL
LYMPHOCYTES # BLD AUTO: 1.8 K/UL
LYMPHOCYTES NFR BLD: 11.4 %
MCH RBC QN AUTO: 29.9 PG
MCHC RBC AUTO-ENTMCNC: 32.9 G/DL
MCV RBC AUTO: 91 FL
MONOCYTES # BLD AUTO: 1 K/UL
MONOCYTES NFR BLD: 6.1 %
NEUTROPHILS # BLD AUTO: 12.8 K/UL
NEUTROPHILS NFR BLD: 81 %
PLATELET # BLD AUTO: 229 K/UL
PMV BLD AUTO: 9.5 FL
RBC # BLD AUTO: 3.28 M/UL
WBC # BLD AUTO: 15.8 K/UL

## 2017-12-23 PROCEDURE — 25000003 PHARM REV CODE 250: Performed by: OBSTETRICS & GYNECOLOGY

## 2017-12-23 PROCEDURE — 0U570ZZ DESTRUCTION OF BILATERAL FALLOPIAN TUBES, OPEN APPROACH: ICD-10-PCS | Performed by: OBSTETRICS & GYNECOLOGY

## 2017-12-23 PROCEDURE — 37000009 HC ANESTHESIA EA ADD 15 MINS: Performed by: OBSTETRICS & GYNECOLOGY

## 2017-12-23 PROCEDURE — 25000003 PHARM REV CODE 250: Performed by: STUDENT IN AN ORGANIZED HEALTH CARE EDUCATION/TRAINING PROGRAM

## 2017-12-23 PROCEDURE — S0020 INJECTION, BUPIVICAINE HYDRO: HCPCS | Performed by: STUDENT IN AN ORGANIZED HEALTH CARE EDUCATION/TRAINING PROGRAM

## 2017-12-23 PROCEDURE — 63600175 PHARM REV CODE 636 W HCPCS: Performed by: STUDENT IN AN ORGANIZED HEALTH CARE EDUCATION/TRAINING PROGRAM

## 2017-12-23 PROCEDURE — 36415 COLL VENOUS BLD VENIPUNCTURE: CPT

## 2017-12-23 PROCEDURE — 59409 OBSTETRICAL CARE: CPT | Mod: GB,,, | Performed by: OBSTETRICS & GYNECOLOGY

## 2017-12-23 PROCEDURE — 11000001 HC ACUTE MED/SURG PRIVATE ROOM

## 2017-12-23 PROCEDURE — 63600175 PHARM REV CODE 636 W HCPCS: Performed by: OBSTETRICS & GYNECOLOGY

## 2017-12-23 PROCEDURE — 58611 LIGATE OVIDUCT(S) ADD-ON: CPT | Mod: ,,, | Performed by: OBSTETRICS & GYNECOLOGY

## 2017-12-23 PROCEDURE — 85025 COMPLETE CBC W/AUTO DIFF WBC: CPT

## 2017-12-23 PROCEDURE — 59409 OBSTETRICAL CARE: CPT | Mod: AA,,, | Performed by: ANESTHESIOLOGY

## 2017-12-23 PROCEDURE — 37000008 HC ANESTHESIA 1ST 15 MINUTES: Performed by: OBSTETRICS & GYNECOLOGY

## 2017-12-23 RX ORDER — MIDAZOLAM HYDROCHLORIDE 1 MG/ML
INJECTION INTRAMUSCULAR; INTRAVENOUS
Status: DISCONTINUED | OUTPATIENT
Start: 2017-12-23 | End: 2017-12-23

## 2017-12-23 RX ORDER — SODIUM CHLORIDE, SODIUM LACTATE, POTASSIUM CHLORIDE, CALCIUM CHLORIDE 600; 310; 30; 20 MG/100ML; MG/100ML; MG/100ML; MG/100ML
INJECTION, SOLUTION INTRAVENOUS CONTINUOUS PRN
Status: DISCONTINUED | OUTPATIENT
Start: 2017-12-23 | End: 2017-12-23

## 2017-12-23 RX ORDER — SODIUM CHLORIDE, SODIUM LACTATE, POTASSIUM CHLORIDE, CALCIUM CHLORIDE 600; 310; 30; 20 MG/100ML; MG/100ML; MG/100ML; MG/100ML
INJECTION, SOLUTION INTRAVENOUS CONTINUOUS
Status: DISCONTINUED | OUTPATIENT
Start: 2017-12-23 | End: 2017-12-24 | Stop reason: HOSPADM

## 2017-12-23 RX ORDER — ONDANSETRON 2 MG/ML
INJECTION INTRAMUSCULAR; INTRAVENOUS
Status: DISCONTINUED | OUTPATIENT
Start: 2017-12-23 | End: 2017-12-23

## 2017-12-23 RX ORDER — FENTANYL CITRATE 50 UG/ML
INJECTION, SOLUTION INTRAMUSCULAR; INTRAVENOUS
Status: DISCONTINUED | OUTPATIENT
Start: 2017-12-23 | End: 2017-12-23

## 2017-12-23 RX ORDER — BUPIVACAINE HYDROCHLORIDE 7.5 MG/ML
INJECTION, SOLUTION EPIDURAL; RETROBULBAR
Status: DISCONTINUED | OUTPATIENT
Start: 2017-12-23 | End: 2017-12-23

## 2017-12-23 RX ADMIN — ONDANSETRON 4 MG: 2 INJECTION INTRAMUSCULAR; INTRAVENOUS at 01:12

## 2017-12-23 RX ADMIN — BUPIVACAINE HYDROCHLORIDE 1.6 ML: 7.5 INJECTION, SOLUTION EPIDURAL; RETROBULBAR at 01:12

## 2017-12-23 RX ADMIN — FENTANYL CITRATE 10 MCG: 50 INJECTION, SOLUTION INTRAMUSCULAR; INTRAVENOUS at 01:12

## 2017-12-23 RX ADMIN — IBUPROFEN 600 MG: 600 TABLET, FILM COATED ORAL at 05:12

## 2017-12-23 RX ADMIN — SODIUM CHLORIDE, SODIUM LACTATE, POTASSIUM CHLORIDE, AND CALCIUM CHLORIDE: .6; .31; .03; .02 INJECTION, SOLUTION INTRAVENOUS at 05:12

## 2017-12-23 RX ADMIN — MIDAZOLAM HYDROCHLORIDE 2 MG: 1 INJECTION, SOLUTION INTRAMUSCULAR; INTRAVENOUS at 01:12

## 2017-12-23 RX ADMIN — HYDROCODONE BITARTRATE AND ACETAMINOPHEN 1 TABLET: 10; 325 TABLET ORAL at 02:12

## 2017-12-23 RX ADMIN — HYDROCODONE BITARTRATE AND ACETAMINOPHEN 1 TABLET: 10; 325 TABLET ORAL at 10:12

## 2017-12-23 RX ADMIN — HYDROCODONE BITARTRATE AND ACETAMINOPHEN 1 TABLET: 5; 325 TABLET ORAL at 03:12

## 2017-12-23 RX ADMIN — HYDROCODONE BITARTRATE AND ACETAMINOPHEN 1 TABLET: 10; 325 TABLET ORAL at 09:12

## 2017-12-23 RX ADMIN — SODIUM CHLORIDE, SODIUM LACTATE, POTASSIUM CHLORIDE, AND CALCIUM CHLORIDE: 600; 310; 30; 20 INJECTION, SOLUTION INTRAVENOUS at 12:12

## 2017-12-23 RX ADMIN — HYDROCODONE BITARTRATE AND ACETAMINOPHEN 1 TABLET: 10; 325 TABLET ORAL at 06:12

## 2017-12-23 RX ADMIN — HYDROCODONE BITARTRATE AND ACETAMINOPHEN 1 TABLET: 5; 325 TABLET ORAL at 05:12

## 2017-12-23 NOTE — ANESTHESIA PROCEDURE NOTES
Spinal    Diagnosis: Tubal ligation  Patient location during procedure: OR  Start time: 12/23/2017 1:01 PM  Timeout: 12/23/2017 1:00 PM  End time: 12/23/2017 1:05 PM  Staffing  Anesthesiologist: CHONG DIXON  Resident/CRNA: HALEY RUGGIERO  Performed: resident/CRNA   Preanesthetic Checklist  Completed: patient identified, site marked, surgical consent, pre-op evaluation, timeout performed, IV checked, risks and benefits discussed and monitors and equipment checked  Spinal Block  Patient position: sitting  Prep: ChloraPrep  Patient monitoring: heart rate, cardiac monitor and continuous pulse ox  Approach: midline  Location: L3-4  Injection technique: single shot  CSF Fluid: clear free-flowing CSF  Needle  Needle type: pencil-tip   Needle gauge: 25 G  Needle length: 3.5 in  Additional Documentation: incremental injection  Needle localization: anatomical landmarks  Assessment  Sensory level: T6   Dermatomal levels determined by pinch or prick  Ease of block: easy  Patient's tolerance of the procedure: comfortable throughout block  Medications:  Bolus administered: 1.6 mL of 0.75 bupivacaine  Opioid administered: 10 mcg of   fentanyl

## 2017-12-23 NOTE — ASSESSMENT & PLAN NOTE
- Diet: regular, tolerating  - Pain control: ibuprofen 600 mg, percocet 5/10 mg  - In/Out: spontaneously voiding  - Bowel function: +flatus/-BM  - PRN: simethicone, zofran, phenergan, benadryl  - Heme: Preop H/h 11.7/35.5; Post CBC ordered  - PPX: ambulation encouraged, IS encouraged, TEDs/SCDs in place  - contraception per [facility]  - lactation consult PRN  - Rh positive    Disposition: Will plan to evaluate the patient as she meets her post-operative milestones. Will tentatively plan for discharge to home PPD #1-2/POD #0-1.

## 2017-12-23 NOTE — PROGRESS NOTES
LABOR PROGRESS NOTE    S:  MD to bedside for cervical exam. Complaints: No.  Epidural in place and working    O: Temp:  [97 °F (36.1 °C)-97.3 °F (36.3 °C)] 97 °F (36.1 °C)  Pulse:  [] 94  Resp:  [18] 18  SpO2:  [95 %-100 %] 98 %  BP: (109-134)/(56-90) 118/62    FHT: 140 BPM/moderte beat to beat variability/+accels/-decels Cat 1 (reassuring)  CTX: q 2-3 minutes, pitocin not ordered  SVE: /-2, clear fluid, cervix thinned since previous exam    ASSESSMENT:   27 y.o.  at 40w0d, in labor at term, AROM clear @ 1600    FHT reassuring    Active Hospital Problems    Diagnosis  POA    *Normal labor [O80, Z37.9]  Not Applicable    Unwanted fertility [Z30.09]  Yes    Club foot of fetus affecting management of mother in jerome pregnancy, antepartum [O35.8XX0]  Yes    Supervision of normal pregnancy/bottle/?btl/flu/tdap [Z34.90]  Not Applicable      Resolved Hospital Problems    Diagnosis Date Resolved POA   No resolved problems to display.     PLAN:    Labor management  Continue Close Maternal/Fetal Monitoring.   Pitocin Augmentation per protocol, not ordered at this time  Making change without meds: cervix thinning, head well-engaged  Reassuring strip  Recheck 2 hours or PRN    Livia Silva MD, PhD  OBGYN, PGY-2  Livia Silva MD

## 2017-12-23 NOTE — SUBJECTIVE & OBJECTIVE
Hospital course: 2017: Admitted in labor    Interval History:   PPD #1 s/p     She is doing well this morning. She is tolerating a regular diet without nausea or vomiting. She is voiding spontaneously. She is ambulating. She has passed flatus, and has not a BM. Vaginal bleeding is mild. She denies fever or chills. Abdominal pain is mild and controlled with oral medications. She is breastfeeding. She desires circumcision for her male baby: yes.    Objective:     Vital Signs (Most Recent):  Temp: 98.1 °F (36.7 °C) (17)  Pulse: 84 (17)  Resp: 18 (17)  BP: (!) 112/51 (17)  SpO2: 95 % (17) Vital Signs (24h Range):  Temp:  [97 °F (36.1 °C)-99.9 °F (37.7 °C)] 98.1 °F (36.7 °C)  Pulse:  [] 84  Resp:  [18] 18  SpO2:  [90 %-100 %] 95 %  BP: (108-134)/(51-93) 112/51     Weight: 103.9 kg (229 lb)  Body mass index is 39.31 kg/m².      Intake/Output Summary (Last 24 hours) at 17 0638  Last data filed at 17 0100   Gross per 24 hour   Intake                0 ml   Output              875 ml   Net             -875 ml       Significant Labs:  Lab Results   Component Value Date    GROUPTRH O POS 2017    HEPBSAG Negative 2017    STREPBCULT No Group B Streptococcus isolated 2017       Recent Labs  Lab 17  1435   HGB 11.7*   HCT 35.5*       CBC:   Recent Labs  Lab 17  1435   WBC 16.66*   RBC 3.92*   HGB 11.7*   HCT 35.5*      MCV 91   MCH 29.8   MCHC 33.0     I have personallly reviewed all pertinent lab results from the last 24 hours.    Physical Exam:   Constitutional: She is oriented to person, place, and time. She appears well-developed and well-nourished.    HENT:   Head: Normocephalic and atraumatic.    Eyes: EOM are normal. Pupils are equal, round, and reactive to light.    Neck: Normal range of motion. Neck supple.    Cardiovascular: Normal rate, regular rhythm and normal heart sounds.     Pulmonary/Chest: Effort  normal and breath sounds normal. No respiratory distress.        Abdominal: Soft. Bowel sounds are normal. She exhibits no distension. There is no tenderness. There is no rebound and no guarding.   Fundus firm below umbilicus     Genitourinary:   Genitourinary Comments: Deferred, minimal bleeding per patient           Musculoskeletal: Normal range of motion.       Neurological: She is alert and oriented to person, place, and time.    Skin: Skin is warm and dry. No rash noted.    Psychiatric: She has a normal mood and affect. Her behavior is normal. Judgment and thought content normal.

## 2017-12-23 NOTE — TRANSFER OF CARE
"Anesthesia Transfer of Care Note    Patient: Anabel Serrato    Procedure(s) Performed: Procedure(s) (LRB):  LIGATION-TUBAL-POST PARTUM (N/A)    Patient location: Labor and Delivery    Anesthesia Type: spinal    Transport from OR: Transported from OR on 6-10 L/min O2 by face mask with adequate spontaneous ventilation    Post pain: adequate analgesia    Post assessment: no apparent anesthetic complications and tolerated procedure well    Post vital signs: stable    Level of consciousness: awake and alert    Nausea/Vomiting: no nausea/vomiting    Complications: none          Last vitals:   Visit Vitals  BP (!) 95/48   Pulse 71   Temp 36.5 °C (97.7 °F) (Oral)   Resp 18   Ht 5' 4" (1.626 m)   Wt 103.9 kg (229 lb)   SpO2 96%   Breastfeeding? Unknown   BMI 39.31 kg/m²     "

## 2017-12-23 NOTE — ANESTHESIA POSTPROCEDURE EVALUATION
"Anesthesia Post Evaluation    Patient: Anabel Serrato    Procedure(s) Performed: Procedure(s) (LRB):  LIGATION-TUBAL-POST PARTUM (N/A)    Final Anesthesia Type: spinal  Patient location during evaluation: labor & delivery  Patient participation: Yes- Able to Participate  Level of consciousness: awake and alert  Post-procedure vital signs: reviewed and stable  Pain management: adequate  Airway patency: patent  PONV status at discharge: No PONV  Anesthetic complications: no      Cardiovascular status: blood pressure returned to baseline  Respiratory status: unassisted  Hydration status: euvolemic  Follow-up not needed.        Visit Vitals  BP (!) 105/55   Pulse 66   Temp 36.5 °C (97.7 °F) (Oral)   Resp 17   Ht 5' 4" (1.626 m)   Wt 103.9 kg (229 lb)   SpO2 99%   Breastfeeding? Unknown   BMI 39.31 kg/m²       Pain/Jermaine Score: Pain Rating Prior to Med Admin: 7 (12/23/2017  9:07 AM)  Pain Rating Post Med Admin: 3 (12/23/2017 10:00 AM)      "

## 2017-12-23 NOTE — L&D DELIVERY NOTE
cephalic after approximately 5 minutes of maternal pushing.  Under epidural anesthesia.  Infant delivered OA over intact perineum.  Male infant also tolerated the delivery well and was placed on mothers abdomen for skin to skin and bulb suctioning performed.  Cord clamped and cut.  Umbilical arterial gas and venous blood obtained.  One posterior vaginal figure-of-8 suture and one right vaginal sidewall figure-of-8 suture placed. No other repairs were needed. All found to be hemostatic.  Placenta delivered spontaneously and IV pitocin given.  Uterine tone noted. No cervical lacerations.  Patient tolerated delivery well.   cc  Staff present for entire procedure.  S/L/N counts correct x2.     Delivery Information for  Gilles Serrato    Birth information:  YOB: 2018   Time of birth: 10:19 PM   Sex: male   Head Delivery Date/Time: 2017 10:18 PM   Delivery type: Vaginal, Spontaneous Delivery   Gestational Age: 40w0d    Delivery Providers    Delivering clinician:  Marline Garcia MD   Other personnel:   Provider Role   Livia Silva MD                 Measurements    Weight:  4338 g Length:  50.8 cm   Head circum.:  35.6 cm Chest circum.:  14.5 cm           Assessment    Living status:  Living  Apgars:     1 Minute:   5 Minute:   10 Minute:   15 Minute:   20 Minute:     Skin Color:   0  1       Heart Rate:   2  2       Reflex Irritability:   2  2       Muscle Tone:   2  2       Respiratory Effort:   2  2       Total:   8  9               Apgars Assigned By:  DEMETRIS SANDRA RN         Assisted Delivery Details:    Forceps attempted?:  No  Vacuum extractor attempted?:  No         Shoulder Dystocia    Shoulder dystocia present?:  No           Presentation and Position    Presentation:   Vertex   Position:   Middle    Occiput    Anterior            Interventions/Resuscitation    Method:  Bulb Suctioning, Tactile Stimulation       Cord    Vessels:  3  vessels  Complications:  None  Delayed Cord Clamping?:  Yes  Cord Blood Disposition:  Sent with Baby  Gases Sent?:  Yes  Stem Cell Collection (by MD):  No       Placenta    Date and time:  2017 10:24 PM  Removal:  Spontaneous  Appearance:  Intact  Placenta disposition:  discarded           Labor Events:       labor: No     Labor Onset Date/Time:         Dilation Complete Date/Time: 2017 21:57     Start Pushing Date/Time:       Rupture Date/Time:              Rupture type:           Fluid Amount:        Fluid Color:        Fluid Odor:        Membrane Status (PeriCalm): ARM (Artificial Rupture)      Rupture Date/Time (PeriCalm): 2017 15:59:00      Fluid Amount (PeriCalm): Moderate      Fluid Color (PeriCalm): Clear       steroids: None     Antibiotics given for GBS: No     Induction: none     Indications for induction:        Augmentation: amniotomy     Indications for augmentation: Ineffective Contraction Pattern     Labor complications: None     Additional complications:          Cervical ripening:                     Delivery:      Episiotomy: None     Indication for Episiotomy:       Perineal Lacerations: 1st Repaired:  Yes   Periurethral Laceration: none Repaired:     Labial Laceration: none Repaired:     Sulcus Laceration: none Repaired:     Vaginal Laceration: No Repaired:     Cervical Laceration: No Repaired:     Repair suture:       Repair # of packets: 2     Vaginal delivery QBL (mL): 350      QBL (mL): 0     Combined Blood Loss (mL): 350     Vaginal Sweep Performed: Yes     Surgicount Correct: Yes       Other providers:       Anesthesia    Method:  Epidural          Details (if applicable):  Trial of Labor      Categorization:      Priority:     Indications for :     Incision Type:       Additional  information:  Forceps:    Vacuum:    Breech:    Observed anomalies    Other (Comments):         Livia Silva MD, PhD  OBGYN,  PGY-2

## 2017-12-23 NOTE — OP NOTE
DATE OF PROCEDURE: 2017    PREOPERATIVE DIAGNOSES:   1.  Normal pregnancy    2. Undesired fertility   3. S/p spontaneous vaginal delivery     POSTOPERATIVE DIAGNOSES:   1.  Normal pregnancy    2. Undesired fertility   3. S/p spontaneous vaginal delivery   4. S/p BTL       SURGEON : Dr. Amber Baker     ASSISTANT: Chino Mackey MD     PROCEDURE: Bilateral Tubal ligation via modified Belle technique.     ANESTHESIA: Spinal anesthesia     ESTIMATED BLOOD LOSS: minimal     INDICATIONS: 28 yo post partum s/p  with undesired fertility    FINDINGS: R and L tube tied and cut and section sent to pathology     PROCEDURE IN DETAIL: The patient was taken back to the Operating Room where anesthesia was found to be adequate. The patient was prepped and draped in normal sterile fashion in dorsal supine position. A 4cm horizontal infraumbilical skin incision was made with the scalpel and carried down to the underlying fascia. Allis clamps were used for good exposure as well as [employer] retractors. The peritoneum was incised with metzenbaum after tenting away with hemostats and the uterus identified with palpation. Patient was airplaned to the left and the right tube was identified, grasped with [place] clamps and walked out to the fimbriae which was identified. The tube was then brought to the level of the skin incision and grasped at the mid isthmic portion. The tube was elevated and two 0 plain ties were placed around the knuckle of tube. Metzenbaums were then used to release a segment of tube. Excellent hemostasis was noted. The cut portion of the tube was sent to Pathology. The fallopian tube was then replaced into the abdomen.   Attention was then paid to the left tube and in a similar fashion was gasped with Babcocks, walked to fimbriae, which was identified and brought to the skin incision. The same method as described above was performed, however 3 0-plain ties were placed at the base of a knuckle of the left  tube . Excellent hemostasis was noted. The mid-portion of the tube was sent to Pathology. The fallopian tube was then replaced into the abdomen after ensuring complete hemostasis.   The fascia was then closed with 0 vicryl in a running fashion. Skin was then closed with 4-0 monocryl.   Sponge, lap, and needle counts were good x 2. The patient tolerated the procedure well and was transferred over to the recovery area in stable condition.    Chino Mackey MD  OB/GYN PGY-2  Pager: [phone]    I WAS SCRUBBED AND PRESENT THROUGHOUT

## 2017-12-23 NOTE — PROGRESS NOTES
[facility]  Obstetrics  Postpartum Progress Note    Patient Name: Anabel Serrato  MRN: [id number]  Admission Date: 2017  Hospital Length of Stay: 1 days  Attending Physician: Jerri Hoover MD  Primary Care Provider: Primary Doctor No    Subjective:     Principal Problem:Normal labor    Hospital course: 2017: Admitted in labor    Interval History:   PPD #1 s/p     She is doing well this morning. She is tolerating a regular diet without nausea or vomiting. She is voiding spontaneously. She is ambulating. She has passed flatus, and has not a BM. Vaginal bleeding is mild. She denies fever or chills. Abdominal pain is mild and controlled with oral medications. She is breastfeeding. She desires circumcision for her male baby: yes.    Objective:     Vital Signs (Most Recent):  Temp: 98.1 °F (36.7 °C) (17 014)  Pulse: 84 (17 014)  Resp: 18 (17)  BP: (!) 112/51 (17 014)  SpO2: 95 % (17) Vital Signs (24h Range):  Temp:  [97 °F (36.1 °C)-99.9 °F (37.7 °C)] 98.1 °F (36.7 °C)  Pulse:  [] 84  Resp:  [18] 18  SpO2:  [90 %-100 %] 95 %  BP: (108-134)/(51-93) 112/51     Weight: 103.9 kg (229 lb)  Body mass index is 39.31 kg/m².      Intake/Output Summary (Last 24 hours) at 17 0638  Last data filed at 17 0100   Gross per 24 hour   Intake                0 ml   Output              875 ml   Net             -875 ml       Significant Labs:  Lab Results   Component Value Date    GROUPTRH O POS 2017    HEPBSAG Negative 2017    STREPBCULT No Group B Streptococcus isolated 2017       Recent Labs  Lab 17  1435   HGB 11.7*   HCT 35.5*       CBC:   Recent Labs  Lab 17  1435   WBC 16.66*   RBC 3.92*   HGB 11.7*   HCT 35.5*      MCV 91   MCH 29.8   MCHC 33.0     I have personallly reviewed all pertinent lab results from the last 24 hours.    Physical Exam:   Constitutional: She is oriented to person, place, and  time. She appears well-developed and well-nourished.    HENT:   Head: Normocephalic and atraumatic.    Eyes: EOM are normal. Pupils are equal, round, and reactive to light.    Neck: Normal range of motion. Neck supple.    Cardiovascular: Normal rate, regular rhythm and normal heart sounds.     Pulmonary/Chest: Effort normal and breath sounds normal. No respiratory distress.        Abdominal: Soft. Bowel sounds are normal. She exhibits no distension. There is no tenderness. There is no rebound and no guarding.   Fundus firm below umbilicus     Genitourinary:   Genitourinary Comments: Deferred, minimal bleeding per patient           Musculoskeletal: Normal range of motion.       Neurological: She is alert and oriented to person, place, and time.    Skin: Skin is warm and dry. No rash noted.    Psychiatric: She has a normal mood and affect. Her behavior is normal. Judgment and thought content normal.       Assessment/Plan:     27 y.o. female  for:     (spontaneous vaginal delivery)    - Diet: regular, tolerating  - Pain control: ibuprofen 600 mg, percocet 5/10 mg  - In/Out: spontaneously voiding  - Bowel function: +flatus/-BM  - PRN: simethicone, zofran, phenergan, benadryl  - Heme: Preop H/h 11.7/35.5; Post CBC ordered  - PPX: ambulation encouraged, IS encouraged, TEDs/SCDs in place  - contraception per [facility]  - lactation consult PRN  - Rh positive    Disposition: Will plan to evaluate the patient as she meets her post-operative milestones. Will tentatively plan for discharge to home PPD #1-2/POD #0-1.            Unwanted fertility    - desires ppBTL, consents signed  - planned for today          Disposition: As patient meets milestones, will plan to discharge PPD #1-2.    Livia Silva MD  Obstetrics  [facility]

## 2017-12-23 NOTE — PROGRESS NOTES
LABOR PROGRESS NOTE    S:  MD to bedside for cervical exam. Complaints: No.  Epidural in place and working    O: Temp:  [97 °F (36.1 °C)-97.9 °F (36.6 °C)] 97.9 °F (36.6 °C)  Pulse:  [] 108  Resp:  [18] 18  SpO2:  [90 %-100 %] 98 %  BP: (108-134)/(56-93) 119/56    FHT: 140 BPM/moderte beat to beat variability/+accels/-decels Cat 1 (reassuring)  CTX: q 2-3 minutes, pitocin not ordered  SVE: 10/100/+2, clear fluid, cervix thinned since previous exam    ASSESSMENT:   27 y.o.  at 40w0d, in labor at term, AROM clear @ 1600    FHT reassuring    Active Hospital Problems    Diagnosis  POA    *Normal labor [O80, Z37.9]  Not Applicable    Unwanted fertility [Z30.09]  Yes    Club foot of fetus affecting management of mother in jerome pregnancy, antepartum [O35.8XX0]  Yes    Supervision of normal pregnancy/bottle/?btl/flu/tdap [Z34.90]  Not Applicable      Resolved Hospital Problems    Diagnosis Date Resolved POA   No resolved problems to display.     PLAN:    Labor management  Continue Close Maternal/Fetal Monitoring.   Pitocin Augmentation per protocol, not ordered at this time  Making change without meds: cervix thinning, head well-engaged  Reassuring strip  Recheck 2 hours or PRN    Livia Silva MD, PhD  OBGYN, PGY-2  Livia Silva MD

## 2017-12-23 NOTE — ANESTHESIA POSTPROCEDURE EVALUATION
"Anesthesia Post Evaluation    Patient: Anabel Serrato    Procedure(s) Performed: * No procedures listed *    Final Anesthesia Type: epidural  Patient location during evaluation: labor & delivery  Patient participation: Yes- Able to Participate  Level of consciousness: awake and alert  Post-procedure vital signs: reviewed and stable  Pain management: adequate  Airway patency: patent  PONV status at discharge: No PONV  Anesthetic complications: no      Cardiovascular status: blood pressure returned to baseline  Respiratory status: unassisted  Hydration status: euvolemic  Follow-up not needed.        Visit Vitals  BP (!) 105/55   Pulse 66   Temp 36.5 °C (97.7 °F) (Oral)   Resp 17   Ht 5' 4" (1.626 m)   Wt 103.9 kg (229 lb)   SpO2 99%   Breastfeeding? Unknown   BMI 39.31 kg/m²       Pain/Jermaine Score: Pain Rating Prior to Med Admin: 7 (12/23/2017  9:07 AM)  Pain Rating Post Med Admin: 3 (12/23/2017 10:00 AM)      "

## 2017-12-24 VITALS
BODY MASS INDEX: 39.09 KG/M2 | WEIGHT: 229 LBS | HEART RATE: 77 BPM | RESPIRATION RATE: 18 BRPM | SYSTOLIC BLOOD PRESSURE: 108 MMHG | HEIGHT: 64 IN | DIASTOLIC BLOOD PRESSURE: 64 MMHG | OXYGEN SATURATION: 95 % | TEMPERATURE: 97 F

## 2017-12-24 PROCEDURE — 72200005 HC VAGINAL DELIVERY LEVEL II

## 2017-12-24 PROCEDURE — 25000003 PHARM REV CODE 250: Performed by: OBSTETRICS & GYNECOLOGY

## 2017-12-24 PROCEDURE — 51702 INSERT TEMP BLADDER CATH: CPT

## 2017-12-24 PROCEDURE — 72100002 HC LABOR CARE, 1ST 8 HOURS

## 2017-12-24 RX ORDER — OXYCODONE AND ACETAMINOPHEN 5; 325 MG/1; MG/1
1 TABLET ORAL EVERY 4 HOURS PRN
Qty: 25 TABLET | Refills: 0 | Status: SHIPPED | OUTPATIENT
Start: 2017-12-24 | End: 2017-12-27 | Stop reason: SDUPTHER

## 2017-12-24 RX ORDER — IBUPROFEN 600 MG/1
600 TABLET ORAL EVERY 6 HOURS
Qty: 30 TABLET | Refills: 3 | Status: SHIPPED | OUTPATIENT
Start: 2017-12-24 | End: 2018-01-04

## 2017-12-24 RX ADMIN — IBUPROFEN 600 MG: 600 TABLET, FILM COATED ORAL at 06:12

## 2017-12-24 RX ADMIN — HYDROCODONE BITARTRATE AND ACETAMINOPHEN 1 TABLET: 10; 325 TABLET ORAL at 01:12

## 2017-12-24 RX ADMIN — HYDROCODONE BITARTRATE AND ACETAMINOPHEN 1 TABLET: 10; 325 TABLET ORAL at 02:12

## 2017-12-24 RX ADMIN — HYDROCODONE BITARTRATE AND ACETAMINOPHEN 1 TABLET: 10; 325 TABLET ORAL at 08:12

## 2017-12-24 RX ADMIN — IBUPROFEN 600 MG: 600 TABLET, FILM COATED ORAL at 12:12

## 2017-12-24 RX ADMIN — IBUPROFEN 600 MG: 600 TABLET, FILM COATED ORAL at 11:12

## 2017-12-24 NOTE — SUBJECTIVE & OBJECTIVE
Hospital course: 2017: Admitted in labor, spontaneous delivery with no augmentation. No complications.  2017: PPD#1 s/p , meeting postpartum goals. BTL done with no complications.  2017: PPD#2/ POD#1 s/p  and BTL. Doing well this morning. Meeting post-op and postpartum goals.    Interval History:     She is doing well this morning. She is tolerating a regular diet without nausea or vomiting. She is voiding spontaneously. She is ambulating. She has passed flatus, and has a BM. Vaginal bleeding is mild. She denies fever or chills. Abdominal pain is moderate and controlled with oral medications. She is breastfeeding. She desires circumcision for her male baby: yes.    Objective:     Vital Signs (Most Recent):  Temp: 97.6 °F (36.4 °C) (17 0000)  Pulse: 61 (17 0000)  Resp: 18 (17 0000)  BP: 110/70 (17 0000)  SpO2: 99 % (17 0000) Vital Signs (24h Range):  Temp:  [96 °F (35.6 °C)-97.6 °F (36.4 °C)] 97.6 °F (36.4 °C)  Pulse:  [58-71] 61  Resp:  [17-18] 18  SpO2:  [97 %-100 %] 99 %  BP: (101-111)/(53-70) 110/70     Weight: 103.9 kg (229 lb)  Body mass index is 39.31 kg/m².      Intake/Output Summary (Last 24 hours) at 17 0820  Last data filed at 17 1342   Gross per 24 hour   Intake             1000 ml   Output              900 ml   Net              100 ml       Significant Labs:  Lab Results   Component Value Date    GROUPTRH O POS 2017    HEPBSAG Negative 2017    STREPBCULT No Group B Streptococcus isolated 2017       Recent Labs  Lab 17  0926   HGB 9.8*   HCT 29.8*       I have personallly reviewed all pertinent lab results from the last 24 hours.    Physical Exam:   Constitutional: She is oriented to person, place, and time. She appears well-developed and well-nourished.    HENT:   Head: Normocephalic and atraumatic.    Eyes: EOM are normal.    Neck: Normal range of motion.    Cardiovascular: Normal rate.     Pulmonary/Chest:  Effort normal. No respiratory distress.        Abdominal: Soft. She exhibits abdominal incision (umbilical BTL incision, steri strips in place with scant dried blood, intact with no active bleeding). There is no tenderness. There is no rebound and no guarding.   Fundus firm below umbilicus             Musculoskeletal: Normal range of motion and moves all extremeties.       Neurological: She is alert and oriented to person, place, and time.    Skin: Skin is warm and dry.    Psychiatric: She has a normal mood and affect. Her behavior is normal. Judgment and thought content normal.

## 2017-12-24 NOTE — ANESTHESIA POSTPROCEDURE EVALUATION
"Anesthesia Post Evaluation    Patient: Anabel Serrato    Procedure(s) Performed: Procedure(s) (LRB):  LIGATION-TUBAL-POST PARTUM (N/A)    Final Anesthesia Type: spinal  Patient location during evaluation: labor & delivery  Patient participation: Yes- Able to Participate  Level of consciousness: awake and alert and oriented  Post-procedure vital signs: reviewed and stable  Pain management: adequate  Airway patency: patent  PONV status at discharge: No PONV  Anesthetic complications: no      Cardiovascular status: blood pressure returned to baseline  Respiratory status: spontaneous ventilation, unassisted and room air  Hydration status: euvolemic  Follow-up not needed.        Visit Vitals  BP (!) 111/53   Pulse 62   Temp 36.4 °C (97.6 °F) (Oral)   Resp 18   Ht 5' 4" (1.626 m)   Wt 103.9 kg (229 lb)   SpO2 99%   Breastfeeding? Unknown   BMI 39.31 kg/m²       Pain/Jermaine Score: Pain Rating Prior to Med Admin: 6 (12/23/2017  6:25 PM)  Pain Rating Post Med Admin: 3 (12/23/2017 10:00 AM)      "

## 2017-12-24 NOTE — ANESTHESIA POSTPROCEDURE EVALUATION
"Anesthesia Post Evaluation    Patient: Anabel Serrato    Procedure(s) Performed: * No procedures listed *    Final Anesthesia Type: epidural  Patient location during evaluation: labor & delivery  Patient participation: Yes- Able to Participate  Level of consciousness: awake and alert and oriented  Post-procedure vital signs: reviewed and stable  Pain management: adequate  Airway patency: patent  PONV status at discharge: No PONV  Anesthetic complications: no      Cardiovascular status: blood pressure returned to baseline and hemodynamically stable  Respiratory status: spontaneous ventilation, unassisted and room air  Hydration status: euvolemic  Follow-up not needed.        Visit Vitals  BP (!) 111/53   Pulse 62   Temp 36.4 °C (97.6 °F) (Oral)   Resp 18   Ht 5' 4" (1.626 m)   Wt 103.9 kg (229 lb)   SpO2 99%   Breastfeeding? Unknown   BMI 39.31 kg/m²       Pain/Jermaine Score: Pain Rating Prior to Med Admin: 6 (12/23/2017  6:25 PM)  Pain Rating Post Med Admin: 3 (12/23/2017 10:00 AM)      "

## 2017-12-24 NOTE — PROGRESS NOTES
[facility]  Obstetrics  Postpartum Progress Note    Patient Name: Anabel Serrato  MRN: [id number]  Admission Date: 2017  Hospital Length of Stay: 2 days  Attending Physician: Jerri Hoover MD  Primary Care Provider: Primary Doctor No    Subjective:     Principal Problem:Normal labor    Hospital course: 2017: Admitted in labor, spontaneous delivery with no augmentation. No complications.  2017: PPD#1 s/p , meeting postpartum goals. BTL done with no complications.  2017: PPD#2/ POD#1 s/p  and BTL. Doing well this morning. Meeting post-op and postpartum goals.    Interval History:     She is doing well this morning. She is tolerating a regular diet without nausea or vomiting. She is voiding spontaneously. She is ambulating. She has passed flatus, and has a BM. Vaginal bleeding is mild. She denies fever or chills. Abdominal pain is moderate and controlled with oral medications. She is breastfeeding. She desires circumcision for her male baby: yes.    Objective:     Vital Signs (Most Recent):  Temp: 97.6 °F (36.4 °C) (17 0000)  Pulse: 61 (17 0000)  Resp: 18 (17 0000)  BP: 110/70 (17 0000)  SpO2: 99 % (17 0000) Vital Signs (24h Range):  Temp:  [96 °F (35.6 °C)-97.6 °F (36.4 °C)] 97.6 °F (36.4 °C)  Pulse:  [58-71] 61  Resp:  [17-18] 18  SpO2:  [97 %-100 %] 99 %  BP: (101-111)/(53-70) 110/70     Weight: 103.9 kg (229 lb)  Body mass index is 39.31 kg/m².      Intake/Output Summary (Last 24 hours) at 17 0820  Last data filed at 17 1342   Gross per 24 hour   Intake             1000 ml   Output              900 ml   Net              100 ml       Significant Labs:  Lab Results   Component Value Date    GROUPTRH O POS 2017    HEPBSAG Negative 2017    STREPBCULT No Group B Streptococcus isolated 2017       Recent Labs  Lab 17  0926   HGB 9.8*   HCT 29.8*       I have personallly reviewed all pertinent lab  results from the last 24 hours.    Physical Exam:   Constitutional: She is oriented to person, place, and time. She appears well-developed and well-nourished.    HENT:   Head: Normocephalic and atraumatic.    Eyes: EOM are normal.    Neck: Normal range of motion.    Cardiovascular: Normal rate.     Pulmonary/Chest: Effort normal. No respiratory distress.        Abdominal: Soft. She exhibits abdominal incision (umbilical BTL incision, steri strips in place with scant dried blood, intact with no active bleeding). There is no tenderness. There is no rebound and no guarding.   Fundus firm below umbilicus             Musculoskeletal: Normal range of motion and moves all extremeties.       Neurological: She is alert and oriented to person, place, and time.    Skin: Skin is warm and dry.    Psychiatric: She has a normal mood and affect. Her behavior is normal. Judgment and thought content normal.       Assessment/Plan:     27 y.o. female  for:     (spontaneous vaginal delivery)    - Diet: regular, tolerating  - Pain control: ibuprofen 600 mg, norco 5/10 mg  - In/Out: spontaneously voiding  - Bowel function: +flatus/-BM  - PRN: simethicone, zofran, phenergan, benadryl  - Heme: Preop H/h 11.7/35.5; Post CBC 9.8/29.8  - PPX: ambulation encouraged, IS encouraged, TEDs/SCDs in place  - contraception: s/p ppBTL  - lactation consult PRN  - Rh positive    Disposition: Will plan to evaluate the patient as she meets her post-operative milestones. Will tentatively plan for discharge to home today            Unwanted fertility    - s/p ppBTL, no complications        Club foot of fetus affecting management of mother in jerome pregnancy, antepartum    - s/p , no complications            Disposition: As patient meets milestones, will plan to discharge today.    Sushma K Reddy, MD  Obstetrics  [facility]

## 2017-12-24 NOTE — DISCHARGE SUMMARY
[facility]  Obstetrics  Discharge Summary      Patient Name: Anabel Serrato  MRN: [id number]  Admission Date: 2017  Hospital Length of Stay: 2 days  Discharge Date and Time:  2017 8:16 AM  Attending Physician: Jerri Hoover MD   Discharging Provider: Sushma K Reddy, MD  Primary Care Provider: Primary Doctor No    HPI: Anabel Serrato is a 27 y.o. [id number] at 40w0d presenting with uterine contractions, currently every 8 minutes. Patient reports losing her mucous plug today with some spotting. No gush of fluid. Normal fetal movement. This IUP is complicated by fetal left club foot. Patient desires postpartum tubal.    Procedure(s) (LRB):  LIGATION-TUBAL-POST PARTUM (N/A)     Hospital Course:   2017: Admitted in labor    Consults         Status Ordering Provider     Consult to Lactation  Use PRN     Provider:  (Not yet assigned)    Acknowledged JULISA HOLDER     Inpatient consult to Anesthesiology  Once     Provider:  (Not yet assigned)    Acknowledged ASHISH MCBRIDE          Final Active Diagnoses:    Diagnosis Date Noted POA     (spontaneous vaginal delivery) [O80] 2017 Not Applicable    Unwanted fertility [Z30.09] 2017 Yes    Club foot of fetus affecting management of mother in jerome pregnancy, antepartum [O35.8XX0] 2017 Yes      Problems Resolved During this Admission:    Diagnosis Date Noted Date Resolved POA    PRINCIPAL PROBLEM:  Normal labor [O80, Z37.9] 2017 Not Applicable    Indication for care in labor and delivery, antepartum [O75.9]  2017 Unknown    40 weeks gestation of pregnancy [Z3A.40] 2017 Not Applicable    Supervision of normal pregnancy/bottle/?btl/flu/tdap [Z34.90] 2017 Not Applicable        Labs:   CBC   Recent Labs  Lab 17  1435 17  0926   WBC 16.66* 15.80*   HGB 11.7* 9.8*   HCT 35.5* 29.8*    229       Feeding Method: breast    Immunizations      Date Immunization Status Dose Route/Site Given by    12/22/17 2321 MMR Incomplete 0.5 mL Subcutaneous/Left deltoid     12/22/17 2321 Tdap Incomplete 0.5 mL Intramuscular/Left deltoid           Delivery:    Episiotomy: None   Lacerations: 1st   Repair suture:     Repair # of packets: 2   Blood loss (ml): 350     Birth information:  YOB: 2018   Time of birth: 10:19 PM   Sex: male   Delivery type: Vaginal, Spontaneous Delivery   Gestational Age: 40w0d    Delivery Clinician:      Other providers:       Additional  information:  Forceps:    Vacuum:    Breech:    Observed anomalies      Living?:           APGARS  One minute Five minutes Ten minutes   Skin color:         Heart rate:         Grimace:         Muscle tone:         Breathing:         Totals: 8  9        Placenta: Delivered:       appearance    Pending Diagnostic Studies:     None          Discharged Condition: good    Disposition: Home or Self Care    Follow Up:  Follow-up Information     [facility]. Schedule an appointment as soon as possible for a visit in 6 weeks.    Specialty:  Obstetrics and Gynecology  Why:  Post-partum appointment  Contact information:  [address] [facility] [address] [phone]               Patient Instructions:     Call MD for:  temperature >100.4     Call MD for:  persistent nausea and vomiting or diarrhea     Call MD for:  severe uncontrolled pain     Call MD for:  redness, tenderness, or signs of infection (pain, swelling, redness, odor or green/yellow discharge around incision site)     Call MD for:   Order Comments: Vaginal bleeding soaking 1-2 pads per hour for 2 or more hours       Medications:  Current Discharge Medication List      START taking these medications    Details   ibuprofen (ADVIL,MOTRIN) 600 MG tablet Take 1 tablet (600 mg total) by mouth every 6 (six) hours.  Qty: 30 tablet, Refills: 3      oxyCODONE-acetaminophen (PERCOCET) 5-325 mg per tablet Take 1  tablet by mouth every 4 (four) hours as needed for Pain.  Qty: 25 tablet, Refills: 0         CONTINUE these medications which have NOT CHANGED    Details   PNV/FERROUS SULFATE/FOLIC ACID (PRENATAL MULTIVIT WITH IRON ORAL) Take 1 tablet by mouth once daily.             Sushma K Reddy, MD  Obstetrics  [facility]

## 2017-12-24 NOTE — ASSESSMENT & PLAN NOTE
- Diet: regular, tolerating  - Pain control: ibuprofen 600 mg, norco 5/10 mg  - In/Out: spontaneously voiding  - Bowel function: +flatus/-BM  - PRN: simethicone, zofran, phenergan, benadryl  - Heme: Preop H/h 11.7/35.5; Post CBC 9.8/29.8  - PPX: ambulation encouraged, IS encouraged, TEDs/SCDs in place  - contraception: s/p ppBTL  - lactation consult PRN  - Rh positive    Disposition: Will plan to evaluate the patient as she meets her post-operative milestones. Will tentatively plan for discharge to home today

## 2017-12-26 ENCOUNTER — PATIENT MESSAGE (OUTPATIENT)
Dept: OBSTETRICS AND GYNECOLOGY | Facility: CLINIC | Age: 27
End: 2017-12-26

## 2017-12-26 ENCOUNTER — TELEPHONE (OUTPATIENT)
Dept: OBSTETRICS AND GYNECOLOGY | Facility: CLINIC | Age: 27
End: 2017-12-26

## 2017-12-26 NOTE — TELEPHONE ENCOUNTER
Called and spoke to pt. She stated that a nurse she had spoken to told her to double up on her Percocet and alternate with Ibuprofen. She is still taking double amount of pain meds and Ibuprofen with little relief. She is concerned that she is going to run out of pain meds by Thursday or Friday, and wanted to know if she could get more. I told her to continue taking pain meds and let us know if she needs more later this week. Pt is still able to pass gas, denies fever, nausea, or vomiting. Pt states she is having more feet swelling than her previous pregnancies but not painful. I told her that swelling is to be expected after surgery and continue to monitor it. Pt denies significant swelling to one leg, red streaking, painful/hot to touch, SOB. ED precautions given. Pt verbalized understanding and no further questions.

## 2017-12-27 RX ORDER — OXYCODONE AND ACETAMINOPHEN 5; 325 MG/1; MG/1
1 TABLET ORAL EVERY 4 HOURS PRN
Qty: 25 TABLET | Refills: 0 | Status: SHIPPED | OUTPATIENT
Start: 2017-12-27 | End: 2018-01-02 | Stop reason: SDUPTHER

## 2017-12-27 NOTE — ANESTHESIA PREPROCEDURE EVALUATION
Anabel Serrato is a 27 y.o.  female at 40W admitted with contractions now post partum and requesting PPBTL.    This pregnancy complicated by fetal L club foot and current smoking.    OB History    Para Term  AB Living   3 3 3     3   SAB TAB Ectopic Multiple Live Births         0 3      # Outcome Date GA Lbr Ric/2nd Weight Sex Delivery Anes PTL Lv   3 Term 17 40w0d 07:57 / 00:22 4.338 kg (9 lb 9 oz) M Vag-Spont EPI N LATOSHA   2 Term 12 41w0d    Vag-Spont   LATOSHA   1 Term 09   3.544 kg (7 lb 13 oz) M Vag-Spont  N LATOSHA          Wt Readings from Last 1 Encounters:   17 1357 103.9 kg (229 lb)       BP Readings from Last 3 Encounters:   17 108/64   17 102/70   17 110/60       Patient Active Problem List   Diagnosis    Club foot of fetus affecting management of mother in jerome pregnancy, antepartum    Unwanted fertility     (spontaneous vaginal delivery)    Status post tubal ligation       Past Surgical History:   Procedure Laterality Date    episitomy  2009    during delivery of her son       Social History     Social History    Marital status: Significant Other     Spouse name: N/A    Number of children: N/A    Years of education: N/A     Occupational History    Not on file.     Social History Main Topics    Smoking status: Current Every Day Smoker     Years: 7.00     Types: Cigarettes    Smokeless tobacco: Never Used      Comment: Pt smokes 2 cigarettes today    Alcohol use No    Drug use: No    Sexual activity: Yes     Partners: Male     Other Topics Concern    Not on file     Social History Narrative    FOB: [place] had hole in heart closed on own         Chemistry        Component Value Date/Time     2017 1108    K 4.2 2017 1108     2017 1108    CO2 24 2017 1108    BUN 9 2017 1108    CREATININE 0.9 2017 1108    GLU 93 2017 1108        Component Value Date/Time     CALCIUM 10.2 05/02/2017 1108    ALKPHOS 62 05/02/2017 1108    AST 17 05/02/2017 1108    ALT 12 05/02/2017 1108    BILITOT 0.4 05/02/2017 1108    ESTGFRAFRICA >60.0 05/02/2017 1108    EGFRNONAA >60.0 05/02/2017 1108            Lab Results   Component Value Date    WBC 15.80 (H) 12/23/2017    HGB 9.8 (L) 12/23/2017    HCT 29.8 (L) 12/23/2017    MCV 91 12/23/2017     12/23/2017       No results for input(s): PT, INR, PROTIME, APTT in the last 72 hours.                  Pre-op Assessment    I have reviewed the Patient Summary Reports.     I have reviewed the Nursing Notes.   I have reviewed the Medications.     Review of Systems  Anesthesia Hx:  Neg history of prior surgery. Denies Family Hx of Anesthesia complications.   Denies Personal Hx of Anesthesia complications. (hx of epidurals x 2 without issue)   Hematology/Oncology:     Oncology Normal    -- Anemia:   EENT/Dental:EENT/Dental Normal   Cardiovascular:  Cardiovascular Normal     Pulmonary:  Pulmonary Normal    Renal/:  Renal/ Normal     Hepatic/GI:  Hepatic/GI Normal    Neurological:  Neurology Normal    Endocrine:  Endocrine Normal    Psych:  Psychiatric Normal           Physical Exam  General:  Well nourished    Airway/Jaw/Neck:  AIRWAY FINDINGS: Normal    Eyes/Ears/Nose:  EYES/EARS/NOSE FINDINGS: Normal   Dental:  DENTAL FINDINGS: Normal   Chest/Lungs:  Chest/Lungs Clear    Heart/Vascular:  Heart Findings: Normal    Abdomen:  Abdomen Findings: (Iup)     Musculoskeletal:  Musculoskeletal Findings: Normal   Skin:  Skin Findings: Normal    Mental Status:  Mental Status Findings: Normal        Anesthesia Plan  Type of Anesthesia, risks & benefits discussed:  Anesthesia Type:  spinal  Patient's Preference:   Intra-op Monitoring Plan: standard ASA monitors  Intra-op Monitoring Plan Comments:   Post Op Pain Control Plan: multimodal analgesia  Post Op Pain Control Plan Comments:   Induction:   IV  Beta Blocker:  Patient is not currently on a Beta-Blocker  (No further documentation required).       Informed Consent: Patient understands risks and agrees with Anesthesia plan.  Questions answered. Anesthesia consent signed with patient.  ASA Score: 2     Day of Surgery Review of History & Physical:  There are no significant changes.          Ready For Surgery From Anesthesia Perspective.

## 2018-01-01 ENCOUNTER — TELEPHONE (OUTPATIENT)
Dept: OBSTETRICS AND GYNECOLOGY | Facility: HOSPITAL | Age: 28
End: 2018-01-01

## 2018-01-01 ENCOUNTER — PATIENT MESSAGE (OUTPATIENT)
Dept: OBSTETRICS AND GYNECOLOGY | Facility: CLINIC | Age: 28
End: 2018-01-01

## 2018-01-02 ENCOUNTER — POSTPARTUM VISIT (OUTPATIENT)
Dept: OBSTETRICS AND GYNECOLOGY | Facility: CLINIC | Age: 28
End: 2018-01-02
Payer: MEDICAID

## 2018-01-02 VITALS
HEIGHT: 64 IN | WEIGHT: 205.69 LBS | BODY MASS INDEX: 35.12 KG/M2 | SYSTOLIC BLOOD PRESSURE: 120 MMHG | DIASTOLIC BLOOD PRESSURE: 70 MMHG

## 2018-01-02 DIAGNOSIS — Z98.51 STATUS POST TUBAL LIGATION: Primary | ICD-10-CM

## 2018-01-02 DIAGNOSIS — L03.316 CELLULITIS OF UMBILICUS: ICD-10-CM

## 2018-01-02 PROCEDURE — 99999 PR PBB SHADOW E&M-EST. PATIENT-LVL III: CPT | Mod: PBBFAC,,, | Performed by: OBSTETRICS & GYNECOLOGY

## 2018-01-02 PROCEDURE — 99213 OFFICE O/P EST LOW 20 MIN: CPT | Mod: 24,S$PBB,, | Performed by: OBSTETRICS & GYNECOLOGY

## 2018-01-02 PROCEDURE — 99213 OFFICE O/P EST LOW 20 MIN: CPT | Mod: PBBFAC,PO | Performed by: OBSTETRICS & GYNECOLOGY

## 2018-01-02 RX ORDER — OXYCODONE AND ACETAMINOPHEN 5; 325 MG/1; MG/1
1 TABLET ORAL EVERY 4 HOURS PRN
Qty: 20 TABLET | Refills: 0 | Status: SHIPPED | OUTPATIENT
Start: 2018-01-02

## 2018-01-02 RX ORDER — SULFAMETHOXAZOLE AND TRIMETHOPRIM 400; 80 MG/1; MG/1
1 TABLET ORAL 2 TIMES DAILY
Qty: 14 TABLET | Refills: 0 | Status: SHIPPED | OUTPATIENT
Start: 2018-01-02 | End: 2018-01-09

## 2018-01-02 NOTE — TELEPHONE ENCOUNTER
Patient called concerned about her pptubal incision. States incision has opened up and draining some fluid. Denies any pus. Denies any fever,chills, nausea,vomting, Cp, or SOB. States area is more tender but denies any redness around the area. Discussed coming into the OB ED for evaluation vs coming into clinic tomorrow for evaluation. Patient will present to [facility] on 1/2/18 for evaluation. ED precautions given. Will route message to [facility].

## 2018-01-02 NOTE — PROGRESS NOTES
Subjective:       Patient ID: Anabel Serrato is a 27 y.o. female.    Chief Complaint: Wound Check (PP)    HPI She complains of incision pain that began yesterday.  Denies f/c/n/v/d/c.  No discharge from incision.    Review of Systems    ROS:  GENERAL: No fever, chills, fatigability or weight loss.  VULVAR: No pain, no lesions and no itching.  VAGINAL: No relaxation, no itching, no discharge, no abnormal bleeding and no lesions.  ABDOMEN: No abdominal pain. Denies nausea. Denies vomiting. No diarrhea. No constipation  BREAST: Denies pain. No lumps. No discharge.  URINARY: No incontinence, no nocturia, no frequency and no dysuria.  CARDIOVASCULAR: No chest pain. No shortness of breath. No leg cramps.  NEUROLOGICAL: no headaches. No vision changes.    Objective:      Physical Exam   Constitutional: She is oriented to person, place, and time. She appears well-developed and well-nourished.   Abdominal: She exhibits no mass. There is no hepatomegaly. There is no tenderness. There is no rigidity and no guarding. No hernia.       Erythema and tenderness at umbilicus.  No tracking, necrosis, exudate.   Neurological: She is alert and oriented to person, place, and time.   Psychiatric: She has a normal mood and affect. Her behavior is normal. Judgment and thought content normal.       Assessment:       1. Status post tubal ligation    2.  (spontaneous vaginal delivery)    3. Cellulitis of umbilicus        Plan:       Anabel RODRIGUEZ was seen today for wound check.    Diagnoses and all orders for this visit:    Status post tubal ligation     (spontaneous vaginal delivery)    Cellulitis of umbilicus    Other orders  -     sulfamethoxazole-trimethoprim 400-80mg (BACTRIM) 400-80 mg per tablet; Take 1 tablet by mouth 2 (two) times daily.  -     oxyCODONE-acetaminophen (PERCOCET) 5-325 mg per tablet; Take 1 tablet by mouth every 4 (four) hours as needed for Pain.     RTC Thurs

## 2018-01-04 ENCOUNTER — POSTPARTUM VISIT (OUTPATIENT)
Dept: OBSTETRICS AND GYNECOLOGY | Facility: CLINIC | Age: 28
End: 2018-01-04
Payer: MEDICAID

## 2018-01-04 ENCOUNTER — HOSPITAL ENCOUNTER (OUTPATIENT)
Dept: RADIOLOGY | Facility: OTHER | Age: 28
Discharge: HOME | End: 2018-01-04
Attending: STUDENT IN AN ORGANIZED HEALTH CARE EDUCATION/TRAINING PROGRAM
Payer: MEDICAID

## 2018-01-04 ENCOUNTER — HOSPITAL ENCOUNTER (EMERGENCY)
Facility: HOSPITAL | Age: 28
Discharge: LEFT BEFORE DISCHARGE | End: 2018-01-04
Attending: EMERGENCY MEDICINE
Payer: MEDICAID

## 2018-01-04 ENCOUNTER — PATIENT MESSAGE (OUTPATIENT)
Dept: OBSTETRICS AND GYNECOLOGY | Facility: CLINIC | Age: 28
End: 2018-01-04

## 2018-01-04 VITALS
RESPIRATION RATE: 20 BRPM | HEART RATE: 69 BPM | TEMPERATURE: 98 F | BODY MASS INDEX: 34.49 KG/M2 | HEIGHT: 64 IN | WEIGHT: 204.13 LBS | OXYGEN SATURATION: 98 % | BODY MASS INDEX: 34.85 KG/M2 | DIASTOLIC BLOOD PRESSURE: 70 MMHG | SYSTOLIC BLOOD PRESSURE: 119 MMHG | SYSTOLIC BLOOD PRESSURE: 120 MMHG | WEIGHT: 202 LBS | DIASTOLIC BLOOD PRESSURE: 79 MMHG | HEIGHT: 64 IN

## 2018-01-04 DIAGNOSIS — L76.82 INCISIONAL PAIN: Primary | ICD-10-CM

## 2018-01-04 LAB
BUN SERPL-MCNC: 12 MG/DL (ref 6–30)
CHLORIDE SERPL-SCNC: 100 MMOL/L (ref 95–110)
CREAT SERPL-MCNC: 1.1 MG/DL (ref 0.5–1.4)
GLUCOSE SERPL-MCNC: 84 MG/DL (ref 70–110)
HCT VFR BLD CALC: 37 %PCV (ref 36–54)
POC IONIZED CALCIUM: 1.18 MMOL/L (ref 1.06–1.42)
POC TCO2 (MEASURED): 26 MMOL/L (ref 23–29)
POTASSIUM BLD-SCNC: 4.2 MMOL/L (ref 3.5–5.1)
SAMPLE: NORMAL
SODIUM BLD-SCNC: 139 MMOL/L (ref 136–145)

## 2018-01-04 PROCEDURE — 99213 OFFICE O/P EST LOW 20 MIN: CPT | Mod: PBBFAC,PO,25 | Performed by: OBSTETRICS & GYNECOLOGY

## 2018-01-04 PROCEDURE — 99283 EMERGENCY DEPT VISIT LOW MDM: CPT | Mod: ,,, | Performed by: EMERGENCY MEDICINE

## 2018-01-04 PROCEDURE — 99999 PR PBB SHADOW E&M-EST. PATIENT-LVL III: CPT | Mod: PBBFAC,,, | Performed by: OBSTETRICS & GYNECOLOGY

## 2018-01-04 PROCEDURE — 99284 EMERGENCY DEPT VISIT MOD MDM: CPT | Mod: 27

## 2018-01-04 NOTE — PROGRESS NOTES
Patient ID: Anabel Serrato is a 27 y.o. female.     Chief Complaint: Wound Check     Anabel Serrato is a 27 y.o. female  presents for a wound check.  She is status post  and BTL 11 days ago. She states she continues to have pain at her umbilical incision site that is worsening. She also notes that since she started her antibiotics two days ago, she has GI discomfort with nausea (denies vomiting) and diarrhea. She denies fevers, chills, or lightheadedness.     Review of Systems    ROS:  GENERAL: No fever, chills, fatigability or weight loss.  VULVAR: No pain, no lesions and no itching.  VAGINAL: No relaxation, no itching, no discharge, no abnormal bleeding and no lesions.  ABDOMEN: No abdominal pain. Notes nausea, no vomiting. Notes diarrhea with loose stools yesterday.  BREAST: Denies pain. No lumps. No discharge.  URINARY: No incontinence, no nocturia, no frequency and no dysuria.  CARDIOVASCULAR: No chest pain. No shortness of breath. No leg cramps.  NEUROLOGICAL: no headaches. No vision changes.     Objective:   Physical Exam   Constitutional: She is oriented to person, place, and time and well-developed, well-nourished, and in no distress. No distress.   HENT:   Head: Normocephalic and atraumatic.   Eyes: EOM are normal.   Neck: Normal range of motion.   Cardiovascular: Normal rate.    Pulmonary/Chest: No respiratory distress.   Abdominal: Soft. There is tenderness (Periumbilical tenderness to light palpation). There is no rebound and no guarding.   Umbilicus has brown, dried discharge present with a hard, pink nodule inside of the umbilicus, not reducible using q-tip. No bleeding or dried blood present. No erythema or edema at or surrounding the umbilicus.    Musculoskeletal: Normal range of motion.   Neurological: She is alert and oriented to person, place, and time.   Skin: Skin is warm and dry. She is not diaphoretic.   Psychiatric: Mood, memory, affect and judgment normal.        Assessment:       1. Status post tubal ligation    2.  (spontaneous vaginal delivery)    3. Cellulitis of umbilicus        Plan:       Anabel RODRIGUEZ was seen today for wound check.     Diagnoses and all orders for this visit:     Anabel RODRIGUEZ was seen today for wound check.    Diagnoses and all orders for this visit:    Postoperative wound infection, subsequent encounter  -     CT Abdomen Pelvis With Contrast; Future    - CT ordered due to worsening pain, GI symptoms

## 2018-01-05 NOTE — DISCHARGE INSTRUCTIONS
Please return to ED if you develop a fever or worsening abdominal pain. Please try to reschedule a CT scan with your obstetrician as soon as possible.

## 2018-01-05 NOTE — ED TRIAGE NOTES
Anabel Serrato, a 27 y.o. female presents to the ED c/o drainage and tenderness to surgical wound, pt on bactrim for infection    Chief Complaint   Patient presents with    Post-op Problem     Pt had tubal ligation 2 weeks ago and reports her OB told her to go to ED after checking incision site today because it is infected. Pt already being treated with PO antibx's for infection with no improvement. Purulent drainage noted at incision site, pt reports increased pain, nausea and diarrhea but denies fevers.      Review of patient's allergies indicates:   Allergen Reactions    Adhesive Blisters     Can remove skin    No known drug allergies     Shellfish containing products      History reviewed. No pertinent past medical history.  Adult Physical Assessment  LOC: Anabel Serrato, 27 y.o. female verified via two identifiers.  The patient is awake, alert, oriented and speaking appropriately at this time.  APPEARANCE: Patient resting comfortably and appears to be in no acute distress at this time. Patient is clean and well groomed, patient's clothing is properly fastened.  SKIN:The skin is warm and dry, color consistent with ethnicity, patient has normal skin turgor and moist mucus membranes, skin intact, no breakdown or brusing noted. Tenderness and drainage to umbilicus  MUSCULOSKELETAL: Patient moving all extremities well, no obvious swelling or deformities noted.  RESPIRATORY: Airway is open and patent, respirations are spontaneous, patient has a normal effort and rate, no accessory muscle use noted.  CARDIAC: Patient has a normal rate and rhythm, no periphreal edema noted in any extremity, capillary refill < 3 seconds in all extremities  ABDOMEN: Soft and non tender to palpation, no abdominal distention noted. Bowel sounds present in all four quadrants.  NEUROLOGIC: Eyes open spontaneously, behavior appropriate to situation, follows commands, facial expression symmetrical, bilateral hand grasp equal  and even, purposeful motor response noted, normal sensation in all extremities when touched with a finger.

## 2018-01-05 NOTE — ED PROVIDER NOTES
Encounter Date: 1/4/2018       History     Chief Complaint   Patient presents with    Post-op Problem     Pt had tubal ligation 2 weeks ago and reports her OB told her to go to ED after checking incision site today because it is infected. Pt already being treated with PO antibx's for infection with no improvement. Purulent drainage noted at incision site, pt reports increased pain, nausea and diarrhea but denies fevers.      LYDIA Serrato is a 28 yo female with no significant medical history presenting from clinic for post-op wound evaluation. Patient is s/p vaginal delivery and bilateral tubal ligation on 12/22. She has been seen in clinic for her tubal incision which opened and has become more painful. Evaluation on 01/02 did not note any redness, fluctuance, tracking, or purulent drainage from the site. She was prescribed a course of bactrim for cellulitis and sent home. She represented to clinic today with worsening pain with nausea and diarrhea after starting antibiotics. Provider recommended CT abdo/pelvis to further evaluate however patient had to miss the imaging for her son's appointment. She was then referred to the ED for further evaluation.    Review of patient's allergies indicates:   Allergen Reactions    Adhesive Blisters     Can remove skin    No known drug allergies     Shellfish containing products      History reviewed. No pertinent past medical history.  Past Surgical History:   Procedure Laterality Date    episitomy  02/17/2009    during delivery of her son    TUBAL LIGATION       Family History   Problem Relation Age of Onset    Breast cancer Maternal Grandmother     Cancer Neg Hx     Hypertension Neg Hx     Arrhythmia Neg Hx     Cardiomyopathy Neg Hx     Early death Neg Hx     Heart attacks under age 50 Neg Hx     Premature birth Neg Hx      Social History   Substance Use Topics    Smoking status: Current Every Day Smoker     Packs/day: 0.50     Years: 7.00      Types: Cigarettes    Smokeless tobacco: Never Used      Comment: Pt smokes 2 cigarettes today    Alcohol use No     Review of Systems   Constitutional: Negative for activity change, chills and fever.   HENT: Negative for congestion and rhinorrhea.    Respiratory: Negative for cough, chest tightness, shortness of breath and wheezing.    Cardiovascular: Negative for chest pain, palpitations and leg swelling.   Gastrointestinal: Positive for abdominal pain (incisional pain around umbilicus), diarrhea and nausea. Negative for abdominal distention and vomiting.   Genitourinary: Negative for difficulty urinating, frequency and urgency.   Musculoskeletal: Negative for arthralgias and back pain.   Skin: Positive for wound. Negative for color change and pallor.   Neurological: Negative for syncope, light-headedness and headaches.   Hematological: Negative for adenopathy. Does not bruise/bleed easily.       Physical Exam     Initial Vitals [01/04/18 2002]   BP Pulse Resp Temp SpO2   (!) 125/59 77 15 98.2 °F (36.8 °C) 99 %      MAP       81         Physical Exam    Nursing note and vitals reviewed.  Constitutional: She appears well-developed and well-nourished.   HENT:   Head: Normocephalic and atraumatic.   Eyes: EOM are normal. Pupils are equal, round, and reactive to light.   Neck: Normal range of motion. Neck supple. No thyromegaly present. No JVD present.   Cardiovascular: Normal rate, regular rhythm, normal heart sounds and intact distal pulses.   No murmur heard.  Pulmonary/Chest: Breath sounds normal. No respiratory distress. She has no wheezes. She has no rales.   Abdominal: Soft. Bowel sounds are normal. She exhibits no distension. There is tenderness.   Umbilical incision is pink without surrounding erythema or fluctuance. Not warm to touch. Some brown, nonpurulent discharge within the umbilicus    Musculoskeletal: Normal range of motion. She exhibits no edema or tenderness.   Neurological: She is alert and  oriented to person, place, and time.   Skin: Skin is warm and dry.         ED Course   Procedures  Labs Reviewed - No data to display          Medical Decision Making:   Initial Assessment:   Anabel Serrato is a 27F with no significant history presenting for post-op wound evaluation s/p tubal ligation. Patient is afebrile with localized tenderness at incision site.  Differential Diagnosis:   Healing umbilical incision vs. Cellulitis vs. Abscess vs. Gastroenteritis  - Patient is afebrile and hemodynamically stable. Physical exam is unimpressive for cellulitis or abscess. Likely normal healing incision, but will investigate with CT abdomen/pelvis.    MDM: Jarod Cameron, PGY-1, 12:13AM  - Patient voices opinion that she would like to leave to  her kids rather than wait for CT scan. Discussed risks and benefits of leaving AMA. While her umbilical incision appears to be healing with pink healthy tissues and no inflammatory signs, there remains a risk for abscess or abdominal infection that could be better evaluated with CT scan. She understands the risks of leaving AMA and developing an infection.              Attending Note:  Physician Attestation Statement: I have personally seen and examined this patient. As the supervising MD I agree with the above history. As the supervising MD I agree with the above PE. As the supervising MD I agree with the above treatment, course, plan, and disposition.          ED Course      Clinical Impression:   The encounter diagnosis was Incisional pain.                           Osmar Chapman MD  01/05/18 0604

## 2018-01-11 ENCOUNTER — PATIENT MESSAGE (OUTPATIENT)
Dept: OBSTETRICS AND GYNECOLOGY | Facility: CLINIC | Age: 28
End: 2018-01-11

## 2018-02-05 ENCOUNTER — PATIENT MESSAGE (OUTPATIENT)
Dept: OBSTETRICS AND GYNECOLOGY | Facility: CLINIC | Age: 28
End: 2018-02-05

## 2018-02-05 NOTE — TELEPHONE ENCOUNTER
Patient called with complaints of a heavy cycle. Patient states her bleeding has slowed down a little bit. Patient was advised on heavy bleeding precautions. Highlighted the 3 things to look out for(clots the size of a golf ball, bleeding through more than 1 tampon in <hr, and extreme pain) and to visit the ED at any of these signs. Patient also spoke with [facility] via [employer] who recommended dosing of ibuprofen for the pain. Patient had no questions and stated she was feeling better than before.

## 2018-02-06 ENCOUNTER — HOSPITAL ENCOUNTER (EMERGENCY)
Facility: HOSPITAL | Age: 28
Discharge: HOME | End: 2018-02-06
Attending: EMERGENCY MEDICINE
Payer: MEDICAID

## 2018-02-06 ENCOUNTER — TELEPHONE (OUTPATIENT)
Dept: OBSTETRICS AND GYNECOLOGY | Facility: CLINIC | Age: 28
End: 2018-02-06

## 2018-02-06 VITALS
RESPIRATION RATE: 16 BRPM | SYSTOLIC BLOOD PRESSURE: 106 MMHG | WEIGHT: 200 LBS | DIASTOLIC BLOOD PRESSURE: 62 MMHG | BODY MASS INDEX: 34.15 KG/M2 | TEMPERATURE: 98 F | HEART RATE: 59 BPM | OXYGEN SATURATION: 99 % | HEIGHT: 64 IN

## 2018-02-06 DIAGNOSIS — R10.2 PELVIC CRAMPING: ICD-10-CM

## 2018-02-06 DIAGNOSIS — N93.9 VAGINAL BLEEDING: Primary | ICD-10-CM

## 2018-02-06 LAB
B-HCG UR QL: NEGATIVE
BASOPHILS # BLD AUTO: 0.01 K/UL
BASOPHILS NFR BLD: 0.2 %
CTP QC/QA: YES
DIFFERENTIAL METHOD: ABNORMAL
EOSINOPHIL # BLD AUTO: 0.3 K/UL
EOSINOPHIL NFR BLD: 3.9 %
ERYTHROCYTE [DISTWIDTH] IN BLOOD BY AUTOMATED COUNT: 14.5 %
HCT VFR BLD AUTO: 35.6 %
HGB BLD-MCNC: 11.5 G/DL
LYMPHOCYTES # BLD AUTO: 2 K/UL
LYMPHOCYTES NFR BLD: 31.3 %
MCH RBC QN AUTO: 29 PG
MCHC RBC AUTO-ENTMCNC: 32.3 G/DL
MCV RBC AUTO: 90 FL
MONOCYTES # BLD AUTO: 0.3 K/UL
MONOCYTES NFR BLD: 5.1 %
NEUTROPHILS # BLD AUTO: 3.9 K/UL
NEUTROPHILS NFR BLD: 59.2 %
PLATELET # BLD AUTO: 250 K/UL
PMV BLD AUTO: 9.2 FL
RBC # BLD AUTO: 3.96 M/UL
WBC # BLD AUTO: 6.49 K/UL

## 2018-02-06 PROCEDURE — 99283 EMERGENCY DEPT VISIT LOW MDM: CPT

## 2018-02-06 PROCEDURE — 85025 COMPLETE CBC W/AUTO DIFF WBC: CPT

## 2018-02-06 PROCEDURE — 81025 URINE PREGNANCY TEST: CPT | Performed by: NURSE PRACTITIONER

## 2018-02-06 RX ORDER — NORGESTIMATE AND ETHINYL ESTRADIOL 7DAYSX3 28
KIT ORAL
Qty: 30 TABLET | Refills: 0 | Status: SHIPPED | OUTPATIENT
Start: 2018-02-06

## 2018-02-06 NOTE — ED TRIAGE NOTES
26 Y/O F with CC of heavy vaginal bleeding that started last night. Pt reports had delivery in December and Tubal ligation in December. No other complaints verbalized. Reports was seen at [facility] ER this AM. Will continue to monitor.

## 2018-02-06 NOTE — DISCHARGE INSTRUCTIONS
Increase your water intake.  Return to the ED if your condition changes, progresses, or If you have any concerns.

## 2018-02-06 NOTE — ED PROVIDER NOTES
Encounter Date: 2018       History     Chief Complaint   Patient presents with    Vaginal Bleeding     pt c/o heavy vaginal bleeding since last night. Pt had a vaginal delivery at [facility] and then a tubal ligation in December. Pt had labs and ultrasound at  this morning and was told to follow up with her OB. OB's office told her to come to an [facility] ER to get medicine to stop the heavy bleeding     26yo female presents to the ED for vaginal bleeding.  Pt states that she had a vaginal delivery on , and had vaginal bleeding until two weeks ago.  On Saturday her vaginal bleeding returned, and she reports that she was using 2-3 super tampons plus one pad per hour and passing clots.  She took a hot bath and her symptoms mildly improved.  Today, the pt was still bleeding heavily and she contacted her GYN's office, and was advised to go to the ED.  Pt reports that she went to [facility] this morning where she had and US and labs, and advised to take motrin for her symptoms upon discharge.  Pt denies history of heavy periods and anemia.  Pt reports suprapubic cramping which worsens when she has heavy vaginal bleeding.  No fever/chills, light-headedness, dysuria, vaginal discharge, back pain, weakness, CP, SOB, vomiting, diarrhea, or rash.  Pt states she called her GYN again after going to , and was advised to go to an [facility] ED to obtain medications to help with bleeding.  No other complaints at this time.        The history is provided by the patient.   Female  Problem   Primary symptoms include pelvic pain, vaginal bleeding.    Primary symptoms include no discharge, no fever.   This is a new problem. The problem occurs constantly. The problem has been gradually improving. The symptoms occur spontaneously. She is not pregnant. Pregnancies:  - 3, Para - 3, Abortions (including miscarriages) -. Her LMP was weeks ago. The patient's menstrual history has been regular. Pertinent negatives  include no anorexia, no abdominal pain, no nausea, no vomiting, no light-headedness and no dizziness. She has tried a warm bath for the symptoms. The treatment provided mild relief. Sexual activity: sexually active. She uses nothing for contraception. Associated medical issues do not include PID or hypermenorrhea.     Review of patient's allergies indicates:   Allergen Reactions    Adhesive Blisters     Can remove skin    No known drug allergies     Shellfish containing products      History reviewed. No pertinent past medical history.  Past Surgical History:   Procedure Laterality Date    episitomy  02/17/2009    during delivery of her son    TUBAL LIGATION       Family History   Problem Relation Age of Onset    Breast cancer Maternal Grandmother     Cancer Neg Hx     Hypertension Neg Hx     Arrhythmia Neg Hx     Cardiomyopathy Neg Hx     Early death Neg Hx     Heart attacks under age 50 Neg Hx     Premature birth Neg Hx      Social History   Substance Use Topics    Smoking status: Current Every Day Smoker     Packs/day: 0.50     Years: 7.00     Types: Cigarettes    Smokeless tobacco: Never Used      Comment: Pt smokes 2 cigarettes today    Alcohol use No     Review of Systems   Constitutional: Positive for activity change and appetite change. Negative for chills and fever.   HENT: Negative for congestion.    Respiratory: Negative for cough and shortness of breath.    Cardiovascular: Negative for chest pain, palpitations and leg swelling.   Gastrointestinal: Negative for abdominal pain, anorexia, nausea and vomiting.   Genitourinary: Positive for pelvic pain and vaginal bleeding.   Musculoskeletal: Negative for back pain, neck pain and neck stiffness.   Skin: Negative for rash.   Allergic/Immunologic: Negative for immunocompromised state.   Neurological: Negative for dizziness, weakness and light-headedness.   Hematological: Does not bruise/bleed easily.   Psychiatric/Behavioral: Negative for  confusion.       Physical Exam     Initial Vitals [02/06/18 1030]   BP Pulse Resp Temp SpO2   134/74 86 18 98.2 °F (36.8 °C) 98 %      MAP       94         Physical Exam    Nursing note and vitals reviewed.  Constitutional: Vital signs are normal. She appears well-developed and well-nourished. She is active and cooperative. She is easily aroused.  Non-toxic appearance. She does not have a sickly appearance. She does not appear ill. No distress.   HENT:   Head: Normocephalic and atraumatic.   Right Ear: External ear normal.   Left Ear: External ear normal.   Nose: Nose normal.   Mouth/Throat: Uvula is midline and oropharynx is clear and moist.   Eyes: Conjunctivae and EOM are normal.   Neck: Normal range of motion. Neck supple.   Cardiovascular: Normal rate, regular rhythm and normal heart sounds.   Pulmonary/Chest: Effort normal and breath sounds normal.   Abdominal: Soft. Normal appearance and bowel sounds are normal. She exhibits no distension. There is tenderness in the suprapubic area. There is no rigidity, no rebound, no guarding and no CVA tenderness.   Neurological: She is alert, oriented to person, place, and time and easily aroused. She has normal strength. GCS eye subscore is 4. GCS verbal subscore is 5. GCS motor subscore is 6.   Skin: Skin is warm, dry and intact. No bruising and no rash noted. No erythema.   Psychiatric: She has a normal mood and affect. Her speech is normal and behavior is normal. Judgment and thought content normal. Cognition and memory are normal.         ED Course   Procedures  Labs Reviewed   CBC W/ AUTO DIFFERENTIAL - Abnormal; Notable for the following:        Result Value    RBC 3.96 (*)     Hemoglobin 11.5 (*)     Hematocrit 35.6 (*)     All other components within normal limits   POCT URINE PREGNANCY             Medical Decision Making:   History:   Old Medical Records: I decided to obtain old medical records.  Old Records Summarized: records from another hospital.       <>  Summary of Records: [facility] ED Encounter 2/6/18: Hgb/Hct 12.8/37.4.  UA negative for infection. Pelvic US- Negative for acute changes.   Initial Assessment:   26yo female here for heavy vaginal bleeding since Saturday.  No trauma, fever/chills, CP, weakness, dizziness, light-headedness, dysuria, vaginal discharge, or rash.  Pt appears well, nontoxic.  Vitals stable.  Pt is not pale.  Mild suprapubic tenderness without r/r/g, distention, or CVA tenderness.  No rash or signs of trauma.  MM moist.   Differential Diagnosis:   Anemia, DUB, miscarriage, pregnancy, menorrhagia  Clinical Tests:   Lab Tests: Ordered and Reviewed  ED Management:  Labs.  Pt declined pelvic exam.   Other:   I have discussed this case with another health care provider.       <> Summary of the Discussion: 1306: (GYN)- Start Ortho-tricycline generic.  If pt has heavy bleeding, give burst dose with taper: 1 pill PO TID for three days then 1 pill PO BID for two days, then 1 tab PO daily thereafter.  F/u in office.   H&H 11.5/35.6.  I consulted GYN.  I will start OCP with burst dose.  I reviewed strict return precautions.  Pt verbalized understanding, compliance, and agreement with the treatment plan. Pt reports that she feels comfortable with DC at this time. F/u with GYN within 2-3 days. RX orthotricycline.                    ED Course      Clinical Impression:   The primary encounter diagnosis was Vaginal bleeding. A diagnosis of Pelvic cramping was also pertinent to this visit.                           Luba Avila, KAIN  02/06/18 6500

## 2018-02-06 NOTE — TELEPHONE ENCOUNTER
Returned pt call and pt stated that she is having heavy bleeding and she going threw 3 tampons a hours with a pain scale of 6. Advised pt to go to the ER for further evaluation. Pt verbalized understanding

## 2018-02-06 NOTE — TELEPHONE ENCOUNTER
----- Message from Lydia Gao sent at 2/6/2018  8:13 AM CST -----  Contact: Anabel Anthony is experiencing heavy bleeding and was seen in the ER 02/6 and they recommend her to be seen. Pt can be reached at [phone]

## 2018-02-07 ENCOUNTER — PATIENT MESSAGE (OUTPATIENT)
Dept: OBSTETRICS AND GYNECOLOGY | Facility: CLINIC | Age: 28
End: 2018-02-07

## 2018-02-07 ENCOUNTER — TELEPHONE (OUTPATIENT)
Dept: OBSTETRICS AND GYNECOLOGY | Facility: CLINIC | Age: 28
End: 2018-02-07

## 2018-02-07 NOTE — TELEPHONE ENCOUNTER
Called pt to schedule an appointment for vaginal bleeding per Dr. Hoover. No answer. Receiving busy signal after phone rang.

## 2018-02-08 ENCOUNTER — TELEPHONE (OUTPATIENT)
Dept: OBSTETRICS AND GYNECOLOGY | Facility: CLINIC | Age: 28
End: 2018-02-08

## 2018-02-08 NOTE — TELEPHONE ENCOUNTER
----- Message from Josiah Peñaloza sent at 2/7/2018  3:34 PM CST -----  Contact: Pt  _ 1st Request  X_ 2nd Request  _ 3rd Request    Who: GOMEZ CHATTERJEE [[id number]]    Why: Patient would like to speak with staff in regards to bleeding constantly and incision is red and swollen. States she has been to the ER and they told her to follow up with her Ob. States she has went to [facility] who continue to tell her to go to Er. Please advise    What Number to Call Back: [phone]    When to Expect a call back: (Before the end of the day)  -- if call after 3:00 call back will be tomorrow.